# Patient Record
Sex: MALE | Race: WHITE | NOT HISPANIC OR LATINO | Employment: OTHER | URBAN - METROPOLITAN AREA
[De-identification: names, ages, dates, MRNs, and addresses within clinical notes are randomized per-mention and may not be internally consistent; named-entity substitution may affect disease eponyms.]

---

## 2017-08-23 ENCOUNTER — RECORDS - HEALTHEAST (OUTPATIENT)
Dept: LAB | Facility: CLINIC | Age: 77
End: 2017-08-23

## 2017-08-23 LAB
CHOLEST SERPL-MCNC: 135 MG/DL
FASTING STATUS PATIENT QL REPORTED: NORMAL
HDLC SERPL-MCNC: 41 MG/DL
LDLC SERPL CALC-MCNC: 81 MG/DL
TRIGL SERPL-MCNC: 64 MG/DL

## 2017-09-08 ENCOUNTER — AMBULATORY - HEALTHEAST (OUTPATIENT)
Dept: CARDIOLOGY | Facility: CLINIC | Age: 77
End: 2017-09-08

## 2017-09-08 ENCOUNTER — RECORDS - HEALTHEAST (OUTPATIENT)
Dept: ADMINISTRATIVE | Facility: OTHER | Age: 77
End: 2017-09-08

## 2017-09-13 ENCOUNTER — OFFICE VISIT - HEALTHEAST (OUTPATIENT)
Dept: CARDIOLOGY | Facility: CLINIC | Age: 77
End: 2017-09-13

## 2017-09-13 DIAGNOSIS — I49.1 PAC (PREMATURE ATRIAL CONTRACTION): ICD-10-CM

## 2017-09-13 DIAGNOSIS — I49.9 IRREGULAR HEART BEAT: ICD-10-CM

## 2017-09-13 DIAGNOSIS — I49.8 SINUS ARRHYTHMIA SEEN ON ELECTROCARDIOGRAM: ICD-10-CM

## 2017-09-13 DIAGNOSIS — I71.40 ABDOMINAL AORTIC ANEURYSM (AAA) WITHOUT RUPTURE (H): ICD-10-CM

## 2017-09-13 ASSESSMENT — MIFFLIN-ST. JEOR: SCORE: 1636.12

## 2017-09-14 ENCOUNTER — COMMUNICATION - HEALTHEAST (OUTPATIENT)
Dept: VASCULAR SURGERY | Facility: CLINIC | Age: 77
End: 2017-09-14

## 2017-12-08 ENCOUNTER — RECORDS - HEALTHEAST (OUTPATIENT)
Dept: ADMINISTRATIVE | Facility: OTHER | Age: 77
End: 2017-12-08

## 2017-12-12 ENCOUNTER — HOSPITAL ENCOUNTER (OUTPATIENT)
Dept: RESPIRATORY THERAPY | Facility: CLINIC | Age: 77
Discharge: HOME | End: 2017-12-12
Attending: SURGERY

## 2017-12-12 DIAGNOSIS — Z01.818 PRE-OP EXAM: ICD-10-CM

## 2017-12-15 ENCOUNTER — OFFICE VISIT - HEALTHEAST (OUTPATIENT)
Dept: PULMONOLOGY | Facility: OTHER | Age: 77
End: 2017-12-15

## 2017-12-15 DIAGNOSIS — J44.9 CHRONIC OBSTRUCTIVE PULMONARY DISEASE, UNSPECIFIED COPD TYPE (H): ICD-10-CM

## 2017-12-15 DIAGNOSIS — K21.9 GERD (GASTROESOPHAGEAL REFLUX DISEASE): ICD-10-CM

## 2017-12-15 DIAGNOSIS — J92.0 PLEURAL PLAQUE DUE TO ASBESTOS EXPOSURE: ICD-10-CM

## 2017-12-15 DIAGNOSIS — Z72.0 TOBACCO USER: ICD-10-CM

## 2017-12-15 DIAGNOSIS — Z01.811 ENCOUNTER FOR PREOPERATIVE PULMONARY EXAMINATION: ICD-10-CM

## 2017-12-15 ASSESSMENT — MIFFLIN-ST. JEOR: SCORE: 1640.43

## 2017-12-27 ENCOUNTER — AMBULATORY - HEALTHEAST (OUTPATIENT)
Dept: PULMONOLOGY | Facility: OTHER | Age: 77
End: 2017-12-27

## 2017-12-27 DIAGNOSIS — J44.9 COPD (CHRONIC OBSTRUCTIVE PULMONARY DISEASE) (H): ICD-10-CM

## 2017-12-28 ENCOUNTER — AMBULATORY - HEALTHEAST (OUTPATIENT)
Dept: PULMONOLOGY | Facility: OTHER | Age: 77
End: 2017-12-28

## 2017-12-28 DIAGNOSIS — J44.9 COPD (CHRONIC OBSTRUCTIVE PULMONARY DISEASE) (H): ICD-10-CM

## 2018-01-10 ENCOUNTER — RECORDS - HEALTHEAST (OUTPATIENT)
Dept: ADMINISTRATIVE | Facility: OTHER | Age: 78
End: 2018-01-10

## 2019-01-01 ENCOUNTER — HOME CARE/HOSPICE - HEALTHEAST (OUTPATIENT)
Dept: HOME HEALTH SERVICES | Facility: HOME HEALTH | Age: 79
End: 2019-01-01

## 2019-01-01 ENCOUNTER — COMMUNICATION - HEALTHEAST (OUTPATIENT)
Dept: PULMONOLOGY | Facility: OTHER | Age: 79
End: 2019-01-01

## 2019-01-01 ENCOUNTER — TRANSFERRED RECORDS (OUTPATIENT)
Dept: HEALTH INFORMATION MANAGEMENT | Facility: CLINIC | Age: 79
End: 2019-01-01

## 2019-01-01 ENCOUNTER — APPOINTMENT (OUTPATIENT)
Dept: GENERAL RADIOLOGY | Facility: CLINIC | Age: 79
DRG: 268 | End: 2019-01-01
Attending: CLINICAL NURSE SPECIALIST
Payer: MEDICARE

## 2019-01-01 ENCOUNTER — APPOINTMENT (OUTPATIENT)
Dept: PHYSICAL THERAPY | Facility: CLINIC | Age: 79
DRG: 268 | End: 2019-01-01
Attending: SURGERY
Payer: MEDICARE

## 2019-01-01 ENCOUNTER — APPOINTMENT (OUTPATIENT)
Dept: CT IMAGING | Facility: CLINIC | Age: 79
DRG: 268 | End: 2019-01-01
Payer: MEDICARE

## 2019-01-01 ENCOUNTER — ANESTHESIA EVENT (OUTPATIENT)
Dept: SURGERY | Facility: CLINIC | Age: 79
DRG: 268 | End: 2019-01-01
Payer: MEDICARE

## 2019-01-01 ENCOUNTER — RECORDS - HEALTHEAST (OUTPATIENT)
Dept: LAB | Facility: CLINIC | Age: 79
End: 2019-01-01

## 2019-01-01 ENCOUNTER — APPOINTMENT (OUTPATIENT)
Dept: PHYSICAL THERAPY | Facility: CLINIC | Age: 79
DRG: 268 | End: 2019-01-01
Payer: MEDICARE

## 2019-01-01 ENCOUNTER — APPOINTMENT (OUTPATIENT)
Dept: OCCUPATIONAL THERAPY | Facility: CLINIC | Age: 79
DRG: 268 | End: 2019-01-01
Payer: MEDICARE

## 2019-01-01 ENCOUNTER — APPOINTMENT (OUTPATIENT)
Dept: INTERVENTIONAL RADIOLOGY/VASCULAR | Facility: CLINIC | Age: 79
DRG: 268 | End: 2019-01-01
Attending: SURGERY
Payer: MEDICARE

## 2019-01-01 ENCOUNTER — APPOINTMENT (OUTPATIENT)
Dept: OCCUPATIONAL THERAPY | Facility: CLINIC | Age: 79
DRG: 268 | End: 2019-01-01
Attending: PHYSICIAN ASSISTANT
Payer: MEDICARE

## 2019-01-01 ENCOUNTER — ANESTHESIA (OUTPATIENT)
Dept: SURGERY | Facility: CLINIC | Age: 79
DRG: 268 | End: 2019-01-01
Payer: MEDICARE

## 2019-01-01 ENCOUNTER — APPOINTMENT (OUTPATIENT)
Dept: ULTRASOUND IMAGING | Facility: CLINIC | Age: 79
DRG: 268 | End: 2019-01-01
Attending: CLINICAL NURSE SPECIALIST
Payer: MEDICARE

## 2019-01-01 ENCOUNTER — HOSPITAL ENCOUNTER (INPATIENT)
Facility: CLINIC | Age: 79
LOS: 3 days | Discharge: HOME HEALTH | DRG: 268 | End: 2019-05-02
Attending: EMERGENCY MEDICINE | Admitting: INTERNAL MEDICINE
Payer: MEDICARE

## 2019-01-01 ENCOUNTER — PATIENT OUTREACH (OUTPATIENT)
Dept: CARE COORDINATION | Facility: CLINIC | Age: 79
End: 2019-01-01

## 2019-01-01 VITALS
HEIGHT: 70 IN | HEART RATE: 76 BPM | DIASTOLIC BLOOD PRESSURE: 66 MMHG | SYSTOLIC BLOOD PRESSURE: 130 MMHG | RESPIRATION RATE: 18 BRPM | BODY MASS INDEX: 28.03 KG/M2 | OXYGEN SATURATION: 97 % | TEMPERATURE: 95.6 F | WEIGHT: 195.77 LBS

## 2019-01-01 DIAGNOSIS — I71.30 RUPTURED ABDOMINAL AORTIC ANEURYSM (AAA) (H): ICD-10-CM

## 2019-01-01 DIAGNOSIS — D68.9 COAGULOPATHY (H): ICD-10-CM

## 2019-01-01 LAB
ABO + RH BLD: NORMAL
ABO + RH BLD: NORMAL
ALBUMIN SERPL-MCNC: 3.1 G/DL (ref 3.4–5)
ALBUMIN UR-MCNC: 10 MG/DL
ALP SERPL-CCNC: 96 U/L (ref 40–150)
ALT SERPL W P-5'-P-CCNC: 33 U/L (ref 0–70)
ALT SERPL-CCNC: 26 U/L (ref 0–45)
ANGLE RATE OF CLOT GROWTH: 29.4 DEG (ref 59–74)
ANGLE RATE OF CLOT GROWTH: 60.4 DEG (ref 59–74)
ANION GAP SERPL CALCULATED.3IONS-SCNC: 10 MMOL/L (ref 3–14)
ANION GAP SERPL CALCULATED.3IONS-SCNC: 3 MMOL/L (ref 3–14)
ANION GAP SERPL CALCULATED.3IONS-SCNC: 7 MMOL/L (ref 3–14)
ANION GAP SERPL CALCULATED.3IONS-SCNC: 8 MMOL/L (ref 3–14)
ANION GAP SERPL CALCULATED.3IONS-SCNC: 8 MMOL/L (ref 3–14)
ANION GAP SERPL CALCULATED.3IONS-SCNC: 8 MMOL/L (ref 5–18)
ANION GAP SERPL CALCULATED.3IONS-SCNC: 9 MMOL/L (ref 3–14)
ANION GAP SERPL CALCULATED.3IONS-SCNC: 9 MMOL/L (ref 3–14)
APPEARANCE UR: CLEAR
APTT PPP: 34 SEC (ref 22–37)
APTT PPP: 41 SEC (ref 22–37)
APTT PPP: 44 SEC (ref 22–37)
APTT PPP: 49 SEC (ref 22–37)
APTT PPP: 62 SEC (ref 22–37)
AST SERPL W P-5'-P-CCNC: 35 U/L (ref 0–45)
AST SERPL-CCNC: 35 U/L (ref 0–40)
BACTERIA SPEC CULT: NO GROWTH
BACTERIA SPEC CULT: NO GROWTH
BASE DEFICIT BLDA-SCNC: 1.2 MMOL/L
BASE EXCESS BLDA CALC-SCNC: 0.5 MMOL/L
BASOPHILS # BLD AUTO: 0 10E9/L (ref 0–0.2)
BASOPHILS NFR BLD AUTO: 0.4 %
BILIRUB DIRECT SERPL-MCNC: 0.1 MG/DL (ref 0–0.2)
BILIRUB SERPL-MCNC: 0.4 MG/DL (ref 0.2–1.3)
BILIRUB UR QL STRIP: NEGATIVE
BLD GP AB SCN SERPL QL: NORMAL
BLD PROD TYP BPU: NORMAL
BLD UNIT ID BPU: 0
BLOOD BANK CMNT PATIENT-IMP: NORMAL
BLOOD PRODUCT CODE: NORMAL
BPU ID: NORMAL
BUN SERPL-MCNC: 24 MG/DL (ref 8–28)
BUN SERPL-MCNC: 30 MG/DL (ref 7–30)
BUN SERPL-MCNC: 30 MG/DL (ref 7–30)
BUN SERPL-MCNC: 31 MG/DL (ref 7–30)
BUN SERPL-MCNC: 34 MG/DL (ref 7–30)
BUN SERPL-MCNC: 34 MG/DL (ref 7–30)
BUN SERPL-MCNC: 35 MG/DL (ref 7–30)
BUN SERPL-MCNC: 36 MG/DL (ref 7–30)
CA-I BLD-MCNC: 4.2 MG/DL (ref 4.4–5.2)
CA-I BLD-MCNC: 4.4 MG/DL (ref 4.4–5.2)
CA-I BLD-MCNC: 4.4 MG/DL (ref 4.4–5.2)
CA-I BLD-MCNC: 4.6 MG/DL (ref 4.4–5.2)
CA-I BLD-MCNC: 4.6 MG/DL (ref 4.4–5.2)
CA-I BLD-SCNC: 5 MG/DL (ref 4.4–5.2)
CALCIUM SERPL-MCNC: 7.8 MG/DL (ref 8.5–10.1)
CALCIUM SERPL-MCNC: 8 MG/DL (ref 8.5–10.1)
CALCIUM SERPL-MCNC: 8 MG/DL (ref 8.5–10.1)
CALCIUM SERPL-MCNC: 8.3 MG/DL (ref 8.5–10.1)
CALCIUM SERPL-MCNC: 8.4 MG/DL (ref 8.5–10.1)
CALCIUM SERPL-MCNC: 8.5 MG/DL (ref 8.5–10.1)
CALCIUM SERPL-MCNC: 8.6 MG/DL (ref 8.5–10.1)
CALCIUM SERPL-MCNC: 9.3 MG/DL (ref 8.5–10.5)
CHLORIDE BLD-SCNC: 103 MMOL/L (ref 98–107)
CHLORIDE SERPL-SCNC: 103 MMOL/L (ref 94–109)
CHLORIDE SERPL-SCNC: 104 MMOL/L (ref 94–109)
CHLORIDE SERPL-SCNC: 106 MMOL/L (ref 94–109)
CHOLEST SERPL-MCNC: 164 MG/DL
CI HYPOCOAGULATION INDEX: 0.2 RATIO (ref 0–3)
CI HYPOCOAGULATION INDEX: 10.2 RATIO (ref 0–3)
CLOT LYSIS 30M P MA LENFR BLD TEG: 0 % (ref 0–8)
CLOT LYSIS 30M P MA LENFR BLD TEG: 4 % (ref 0–8)
CLOT STRENGTH BLD TEG: 2 KD/SC (ref 5.3–13.2)
CLOT STRENGTH BLD TEG: 5.8 KD/SC (ref 5.3–13.2)
CO2 BLDCOV-SCNC: 24 MMOL/L (ref 21–28)
CO2 BLDCOV-SCNC: 24 MMOL/L (ref 21–28)
CO2 SERPL-SCNC: 23 MMOL/L (ref 20–32)
CO2 SERPL-SCNC: 24 MMOL/L (ref 20–32)
CO2 SERPL-SCNC: 25 MMOL/L (ref 20–32)
CO2 SERPL-SCNC: 25 MMOL/L (ref 20–32)
CO2 SERPL-SCNC: 26 MMOL/L (ref 20–32)
CO2 SERPL-SCNC: 26 MMOL/L (ref 20–32)
CO2 SERPL-SCNC: 26 MMOL/L (ref 22–31)
CO2 SERPL-SCNC: 27 MMOL/L (ref 20–32)
COLOR UR AUTO: ABNORMAL
CREAT SERPL-MCNC: 1.02 MG/DL (ref 0.66–1.25)
CREAT SERPL-MCNC: 1.04 MG/DL (ref 0.66–1.25)
CREAT SERPL-MCNC: 1.05 MG/DL (ref 0.66–1.25)
CREAT SERPL-MCNC: 1.05 MG/DL (ref 0.7–1.3)
CREAT SERPL-MCNC: 1.1 MG/DL (ref 0.66–1.25)
CREAT SERPL-MCNC: 1.11 MG/DL (ref 0.7–1.3)
CREAT SERPL-MCNC: 1.19 MG/DL (ref 0.66–1.25)
CREAT SERPL-MCNC: 1.43 MG/DL (ref 0.66–1.25)
CREAT SERPL-MCNC: 1.46 MG/DL (ref 0.66–1.25)
D DIMER PPP FEU-MCNC: >20 UG/ML FEU (ref 0–0.5)
DIFFERENTIAL METHOD BLD: NORMAL
EOSINOPHIL # BLD AUTO: 0.2 10E9/L (ref 0–0.7)
EOSINOPHIL NFR BLD AUTO: 2.7 %
ERYTHROCYTE [DISTWIDTH] IN BLOOD BY AUTOMATED COUNT: 15.6 % (ref 10–15)
ERYTHROCYTE [DISTWIDTH] IN BLOOD BY AUTOMATED COUNT: 15.8 % (ref 10–15)
ERYTHROCYTE [DISTWIDTH] IN BLOOD BY AUTOMATED COUNT: 15.8 % (ref 10–15)
ERYTHROCYTE [DISTWIDTH] IN BLOOD BY AUTOMATED COUNT: 16 % (ref 10–15)
ERYTHROCYTE [DISTWIDTH] IN BLOOD BY AUTOMATED COUNT: 16.4 % (ref 10–15)
ERYTHROCYTE [DISTWIDTH] IN BLOOD BY AUTOMATED COUNT: 17.1 % (ref 10–15)
ERYTHROCYTE [DISTWIDTH] IN BLOOD BY AUTOMATED COUNT: 17.3 % (ref 10–15)
ERYTHROCYTE [DISTWIDTH] IN BLOOD BY AUTOMATED COUNT: 17.5 % (ref 10–15)
ETHANOL SERPL-MCNC: <0.01 G/DL
FASTING STATUS PATIENT QL REPORTED: NO
FIBRINOGEN PPP-MCNC: 171 MG/DL (ref 200–420)
FIBRINOGEN PPP-MCNC: 194 MG/DL (ref 200–420)
FIBRINOGEN PPP-MCNC: 200 MG/DL (ref 200–420)
FIBRINOGEN PPP-MCNC: 235 MG/DL (ref 200–420)
FIBRINOGEN PPP-MCNC: <61 MG/DL (ref 200–420)
GFR SERPL CREATININE-BSD FRML MDRD: 45 ML/MIN/{1.73_M2}
GFR SERPL CREATININE-BSD FRML MDRD: 46 ML/MIN/{1.73_M2}
GFR SERPL CREATININE-BSD FRML MDRD: 57 ML/MIN/{1.73_M2}
GFR SERPL CREATININE-BSD FRML MDRD: 63 ML/MIN/{1.73_M2}
GFR SERPL CREATININE-BSD FRML MDRD: 66 ML/MIN/{1.73_M2}
GFR SERPL CREATININE-BSD FRML MDRD: 67 ML/MIN/{1.73_M2}
GFR SERPL CREATININE-BSD FRML MDRD: 69 ML/MIN/{1.73_M2}
GFR SERPL CREATININE-BSD FRML MDRD: >60 ML/MIN/1.73M2
GFR SERPL CREATININE-BSD FRML MDRD: >60 ML/MIN/1.73M2
GLUCOSE BLD-MCNC: 131 MG/DL (ref 70–99)
GLUCOSE BLD-MCNC: 143 MG/DL (ref 70–99)
GLUCOSE BLD-MCNC: 153 MG/DL (ref 70–99)
GLUCOSE BLD-MCNC: 95 MG/DL (ref 70–125)
GLUCOSE BLDC GLUCOMTR-MCNC: 148 MG/DL (ref 70–99)
GLUCOSE SERPL-MCNC: 101 MG/DL (ref 70–99)
GLUCOSE SERPL-MCNC: 107 MG/DL (ref 70–99)
GLUCOSE SERPL-MCNC: 108 MG/DL (ref 70–99)
GLUCOSE SERPL-MCNC: 122 MG/DL (ref 70–125)
GLUCOSE SERPL-MCNC: 131 MG/DL (ref 70–99)
GLUCOSE SERPL-MCNC: 144 MG/DL (ref 70–99)
GLUCOSE SERPL-MCNC: 96 MG/DL (ref 70–99)
GLUCOSE SERPL-MCNC: 97 MG/DL (ref 70–99)
GLUCOSE UR STRIP-MCNC: NEGATIVE MG/DL
HCO3 BLD-SCNC: 25 MMOL/L (ref 21–28)
HCO3 BLD-SCNC: 26 MMOL/L (ref 21–28)
HCT VFR BLD AUTO: 21.8 % (ref 40–53)
HCT VFR BLD AUTO: 22.8 % (ref 40–53)
HCT VFR BLD AUTO: 24 % (ref 40–53)
HCT VFR BLD AUTO: 24.7 % (ref 40–53)
HCT VFR BLD AUTO: 25.3 % (ref 40–53)
HCT VFR BLD AUTO: 26.4 % (ref 40–53)
HCT VFR BLD AUTO: 26.7 % (ref 40–53)
HCT VFR BLD AUTO: 31.1 % (ref 40–53)
HCT VFR BLD CALC: 31 %PCV (ref 40–53)
HDLC SERPL-MCNC: 45 MG/DL
HGB BLD CALC-MCNC: 10.5 G/DL (ref 13.3–17.7)
HGB BLD-MCNC: 10 G/DL (ref 13.3–17.7)
HGB BLD-MCNC: 7 G/DL (ref 13.3–17.7)
HGB BLD-MCNC: 7 G/DL (ref 13.3–17.7)
HGB BLD-MCNC: 7.5 G/DL (ref 13.3–17.7)
HGB BLD-MCNC: 7.6 G/DL (ref 13.3–17.7)
HGB BLD-MCNC: 7.7 G/DL (ref 13.3–17.7)
HGB BLD-MCNC: 7.8 G/DL (ref 13.3–17.7)
HGB BLD-MCNC: 7.9 G/DL (ref 13.3–17.7)
HGB BLD-MCNC: 8.4 G/DL (ref 13.3–17.7)
HGB BLD-MCNC: 8.5 G/DL (ref 13.3–17.7)
HGB BLD-MCNC: 8.6 G/DL (ref 13.3–17.7)
HGB UR QL STRIP: ABNORMAL
IMM GRANULOCYTES # BLD: 0.1 10E9/L (ref 0–0.4)
IMM GRANULOCYTES NFR BLD: 0.9 %
INR PPP: 1.31 (ref 0.86–1.14)
INR PPP: 1.33 (ref 0.86–1.14)
INR PPP: 1.33 (ref 0.86–1.14)
INR PPP: 1.35 (ref 0.86–1.14)
INR PPP: 1.44 (ref 0.86–1.14)
INR PPP: 1.48 (ref 0.86–1.14)
INR PPP: 1.96 (ref 0.86–1.14)
INR PPP: 2.06 (ref 0.9–1.1)
INR PPP: 3 (ref 0.86–1.14)
INTERPRETATION ECG - MUSE: NORMAL
K TIME TO SPEC CLOT STRENGTH: 2.4 MIN (ref 1–3)
K TIME TO SPEC CLOT STRENGTH: 9.7 MIN (ref 1–3)
KETONES UR STRIP-MCNC: NEGATIVE MG/DL
LACTATE BLD-SCNC: 0.6 MMOL/L (ref 0.7–2.1)
LACTATE BLD-SCNC: 0.7 MMOL/L (ref 0.7–2)
LACTATE BLD-SCNC: 0.8 MMOL/L (ref 0.7–2)
LACTATE BLD-SCNC: 0.9 MMOL/L (ref 0.7–2)
LACTATE BLD-SCNC: 1.4 MMOL/L (ref 0.7–2)
LDLC SERPL CALC-MCNC: 101 MG/DL
LEUKOCYTE ESTERASE UR QL STRIP: NEGATIVE
LIPASE SERPL-CCNC: 80 U/L (ref 73–393)
LY60 LYSIS AT 60 MINUTES: 0 % (ref 0–15)
LY60 LYSIS AT 60 MINUTES: 13 % (ref 0–15)
LYMPHOCYTES # BLD AUTO: 2.3 10E9/L (ref 0.8–5.3)
LYMPHOCYTES NFR BLD AUTO: 27 %
Lab: NORMAL
Lab: NORMAL
MA MAXIMUM CLOT STRENGTH: 29 MM (ref 55–74)
MA MAXIMUM CLOT STRENGTH: 53.5 MM (ref 55–74)
MAGNESIUM SERPL-MCNC: 2 MG/DL (ref 1.6–2.3)
MAGNESIUM SERPL-MCNC: 2.2 MG/DL (ref 1.6–2.3)
MAGNESIUM SERPL-MCNC: 2.4 MG/DL (ref 1.6–2.3)
MCH RBC QN AUTO: 29 PG (ref 26.5–33)
MCH RBC QN AUTO: 29.5 PG (ref 26.5–33)
MCH RBC QN AUTO: 29.5 PG (ref 26.5–33)
MCH RBC QN AUTO: 29.7 PG (ref 26.5–33)
MCH RBC QN AUTO: 30 PG (ref 26.5–33)
MCH RBC QN AUTO: 30.4 PG (ref 26.5–33)
MCHC RBC AUTO-ENTMCNC: 30.7 G/DL (ref 31.5–36.5)
MCHC RBC AUTO-ENTMCNC: 30.8 G/DL (ref 31.5–36.5)
MCHC RBC AUTO-ENTMCNC: 31.2 G/DL (ref 31.5–36.5)
MCHC RBC AUTO-ENTMCNC: 31.8 G/DL (ref 31.5–36.5)
MCHC RBC AUTO-ENTMCNC: 31.8 G/DL (ref 31.5–36.5)
MCHC RBC AUTO-ENTMCNC: 32.1 G/DL (ref 31.5–36.5)
MCHC RBC AUTO-ENTMCNC: 32.2 G/DL (ref 31.5–36.5)
MCHC RBC AUTO-ENTMCNC: 32.5 G/DL (ref 31.5–36.5)
MCV RBC AUTO: 92 FL (ref 78–100)
MCV RBC AUTO: 93 FL (ref 78–100)
MCV RBC AUTO: 94 FL (ref 78–100)
MCV RBC AUTO: 94 FL (ref 78–100)
MCV RBC AUTO: 97 FL (ref 78–100)
MONOCYTES # BLD AUTO: 0.7 10E9/L (ref 0–1.3)
MONOCYTES NFR BLD AUTO: 8.4 %
MRSA DNA SPEC QL NAA+PROBE: NEGATIVE
NEUTROPHILS # BLD AUTO: 5.1 10E9/L (ref 1.6–8.3)
NEUTROPHILS NFR BLD AUTO: 60.6 %
NITRATE UR QL: NEGATIVE
NRBC # BLD AUTO: 0 10*3/UL
NRBC BLD AUTO-RTO: 0 /100
NUM BPU REQUESTED: 2
NUM BPU REQUESTED: 20
NUM BPU REQUESTED: 5
NUM BPU REQUESTED: 5
O2/TOTAL GAS SETTING VFR VENT: 57 %
O2/TOTAL GAS SETTING VFR VENT: 82 %
PCO2 BLD: 45 MM HG (ref 35–45)
PCO2 BLD: 47 MM HG (ref 35–45)
PCO2 BLDV: 44 MM HG (ref 40–50)
PCO2 BLDV: 44 MM HG (ref 40–50)
PH BLD: 7.35 PH (ref 7.35–7.45)
PH BLD: 7.35 PH (ref 7.35–7.45)
PH BLDV: 7.35 PH (ref 7.32–7.43)
PH BLDV: 7.36 PH (ref 7.32–7.43)
PH UR STRIP: 6 PH (ref 5–7)
PHOSPHATE SERPL-MCNC: 5.2 MG/DL (ref 2.5–4.5)
PLATELET # BLD AUTO: 114 10E9/L (ref 150–450)
PLATELET # BLD AUTO: 154 10E9/L (ref 150–450)
PLATELET # BLD AUTO: 38 10E9/L (ref 150–450)
PLATELET # BLD AUTO: 66 10E9/L (ref 150–450)
PLATELET # BLD AUTO: 77 10E9/L (ref 150–450)
PLATELET # BLD AUTO: 79 10E9/L (ref 150–450)
PLATELET # BLD AUTO: 82 10E9/L (ref 150–450)
PLATELET # BLD AUTO: 85 10E9/L (ref 150–450)
PLATELET # BLD AUTO: 89 10E9/L (ref 150–450)
PO2 BLD: 166 MM HG (ref 80–105)
PO2 BLD: 197 MM HG (ref 80–105)
PO2 BLDV: 20 MM HG (ref 25–47)
PO2 BLDV: 22 MM HG (ref 25–47)
POTASSIUM BLD-SCNC: 4.6 MMOL/L (ref 3.5–5)
POTASSIUM BLD-SCNC: 4.8 MMOL/L (ref 3.4–5.3)
POTASSIUM BLD-SCNC: 4.9 MMOL/L (ref 3.4–5.3)
POTASSIUM BLD-SCNC: 4.9 MMOL/L (ref 3.4–5.3)
POTASSIUM SERPL-SCNC: 3.8 MMOL/L (ref 3.4–5.3)
POTASSIUM SERPL-SCNC: 3.9 MMOL/L (ref 3.4–5.3)
POTASSIUM SERPL-SCNC: 4 MMOL/L (ref 3.4–5.3)
POTASSIUM SERPL-SCNC: 4.1 MMOL/L (ref 3.4–5.3)
POTASSIUM SERPL-SCNC: 4.1 MMOL/L (ref 3.4–5.3)
POTASSIUM SERPL-SCNC: 4.2 MMOL/L (ref 3.4–5.3)
POTASSIUM SERPL-SCNC: 4.4 MMOL/L (ref 3.4–5.3)
POTASSIUM SERPL-SCNC: 4.7 MMOL/L (ref 3.4–5.3)
POTASSIUM SERPL-SCNC: 4.7 MMOL/L (ref 3.5–5)
PROT SERPL-MCNC: 7.3 G/DL (ref 6.8–8.8)
R TIME UNTIL CLOT FORMS: 4.1 MIN (ref 4–9)
R TIME UNTIL CLOT FORMS: 6.9 MIN (ref 4–9)
RADIOLOGIST FLAGS: ABNORMAL
RBC # BLD AUTO: 2.36 10E12/L (ref 4.4–5.9)
RBC # BLD AUTO: 2.37 10E12/L (ref 4.4–5.9)
RBC # BLD AUTO: 2.57 10E12/L (ref 4.4–5.9)
RBC # BLD AUTO: 2.62 10E12/L (ref 4.4–5.9)
RBC # BLD AUTO: 2.68 10E12/L (ref 4.4–5.9)
RBC # BLD AUTO: 2.83 10E12/L (ref 4.4–5.9)
RBC # BLD AUTO: 2.86 10E12/L (ref 4.4–5.9)
RBC # BLD AUTO: 3.33 10E12/L (ref 4.4–5.9)
RBC #/AREA URNS AUTO: 4 /HPF (ref 0–2)
SAO2 % BLDV FROM PO2: 30 %
SAO2 % BLDV FROM PO2: 34 %
SODIUM BLD-SCNC: 137 MMOL/L (ref 133–144)
SODIUM BLD-SCNC: 137 MMOL/L (ref 133–144)
SODIUM BLD-SCNC: 139 MMOL/L (ref 133–144)
SODIUM SERPL-SCNC: 134 MMOL/L (ref 133–144)
SODIUM SERPL-SCNC: 136 MMOL/L (ref 133–144)
SODIUM SERPL-SCNC: 137 MMOL/L (ref 136–145)
SODIUM SERPL-SCNC: 138 MMOL/L (ref 133–144)
SODIUM SERPL-SCNC: 138 MMOL/L (ref 133–144)
SODIUM SERPL-SCNC: 140 MMOL/L (ref 133–144)
SOURCE: ABNORMAL
SP GR UR STRIP: 1.01 (ref 1–1.03)
SPECIMEN EXP DATE BLD: NORMAL
SPECIMEN SOURCE: NORMAL
SQUAMOUS #/AREA URNS AUTO: 1 /HPF (ref 0–1)
TRANSFUSION STATUS PATIENT QL: NORMAL
TRIGL SERPL-MCNC: 89 MG/DL
TROPONIN I BLD-MCNC: 0 UG/L (ref 0–0.08)
TROPONIN I SERPL-MCNC: <0.015 UG/L (ref 0–0.04)
TSH SERPL-ACNC: 3.51 UIU/ML (ref 0.3–5)
UROBILINOGEN UR STRIP-MCNC: NORMAL MG/DL (ref 0–2)
WBC # BLD AUTO: 10.1 10E9/L (ref 4–11)
WBC # BLD AUTO: 5.5 10E9/L (ref 4–11)
WBC # BLD AUTO: 7.8 10E9/L (ref 4–11)
WBC # BLD AUTO: 8.1 10E9/L (ref 4–11)
WBC # BLD AUTO: 8.4 10E9/L (ref 4–11)
WBC # BLD AUTO: 8.9 10E9/L (ref 4–11)
WBC # BLD AUTO: 9.1 10E9/L (ref 4–11)
WBC # BLD AUTO: 9.6 10E9/L (ref 4–11)
WBC #/AREA URNS AUTO: 2 /HPF (ref 0–5)

## 2019-01-01 PROCEDURE — 85730 THROMBOPLASTIN TIME PARTIAL: CPT | Performed by: STUDENT IN AN ORGANIZED HEALTH CARE EDUCATION/TRAINING PROGRAM

## 2019-01-01 PROCEDURE — 36430 TRANSFUSION BLD/BLD COMPNT: CPT | Performed by: EMERGENCY MEDICINE

## 2019-01-01 PROCEDURE — 25000128 H RX IP 250 OP 636

## 2019-01-01 PROCEDURE — 85384 FIBRINOGEN ACTIVITY: CPT | Performed by: SURGERY

## 2019-01-01 PROCEDURE — 85384 FIBRINOGEN ACTIVITY: CPT

## 2019-01-01 PROCEDURE — A9270 NON-COVERED ITEM OR SERVICE: HCPCS | Performed by: SURGERY

## 2019-01-01 PROCEDURE — 84132 ASSAY OF SERUM POTASSIUM: CPT | Performed by: SURGERY

## 2019-01-01 PROCEDURE — 84484 ASSAY OF TROPONIN QUANT: CPT | Performed by: EMERGENCY MEDICINE

## 2019-01-01 PROCEDURE — 94640 AIRWAY INHALATION TREATMENT: CPT

## 2019-01-01 PROCEDURE — 40000003 ZZH STATISTIC IR STAFF TIME IN THE OR

## 2019-01-01 PROCEDURE — 82330 ASSAY OF CALCIUM: CPT | Performed by: SURGERY

## 2019-01-01 PROCEDURE — 85610 PROTHROMBIN TIME: CPT | Performed by: STUDENT IN AN ORGANIZED HEALTH CARE EDUCATION/TRAINING PROGRAM

## 2019-01-01 PROCEDURE — 85027 COMPLETE CBC AUTOMATED: CPT | Performed by: SURGERY

## 2019-01-01 PROCEDURE — 40000275 ZZH STATISTIC RCP TIME EA 10 MIN

## 2019-01-01 PROCEDURE — 94640 AIRWAY INHALATION TREATMENT: CPT | Mod: 76

## 2019-01-01 PROCEDURE — 83690 ASSAY OF LIPASE: CPT | Performed by: EMERGENCY MEDICINE

## 2019-01-01 PROCEDURE — 85610 PROTHROMBIN TIME: CPT | Performed by: SURGERY

## 2019-01-01 PROCEDURE — 99285 EMERGENCY DEPT VISIT HI MDM: CPT | Mod: 25 | Performed by: EMERGENCY MEDICINE

## 2019-01-01 PROCEDURE — 86850 RBC ANTIBODY SCREEN: CPT | Performed by: EMERGENCY MEDICINE

## 2019-01-01 PROCEDURE — 82330 ASSAY OF CALCIUM: CPT

## 2019-01-01 PROCEDURE — 86923 COMPATIBILITY TEST ELECTRIC: CPT | Performed by: EMERGENCY MEDICINE

## 2019-01-01 PROCEDURE — A9270 NON-COVERED ITEM OR SERVICE: HCPCS | Performed by: CLINICAL NURSE SPECIALIST

## 2019-01-01 PROCEDURE — 25000132 ZZH RX MED GY IP 250 OP 250 PS 637

## 2019-01-01 PROCEDURE — 37000009 ZZH ANESTHESIA TECHNICAL FEE, EACH ADDTL 15 MIN: Performed by: SURGERY

## 2019-01-01 PROCEDURE — 74174 CTA ABD&PLVS W/CONTRAST: CPT

## 2019-01-01 PROCEDURE — 84100 ASSAY OF PHOSPHORUS: CPT | Performed by: SURGERY

## 2019-01-01 PROCEDURE — 85396 CLOTTING ASSAY WHOLE BLOOD: CPT

## 2019-01-01 PROCEDURE — 97162 PT EVAL MOD COMPLEX 30 MIN: CPT | Mod: GP | Performed by: PHYSICAL THERAPIST

## 2019-01-01 PROCEDURE — 97165 OT EVAL LOW COMPLEX 30 MIN: CPT | Mod: GO | Performed by: OCCUPATIONAL THERAPIST

## 2019-01-01 PROCEDURE — 99221 1ST HOSP IP/OBS SF/LOW 40: CPT | Mod: GC | Performed by: INTERNAL MEDICINE

## 2019-01-01 PROCEDURE — A9270 NON-COVERED ITEM OR SERVICE: HCPCS | Performed by: STUDENT IN AN ORGANIZED HEALTH CARE EDUCATION/TRAINING PROGRAM

## 2019-01-01 PROCEDURE — 12000001 ZZH R&B MED SURG/OB UMMC

## 2019-01-01 PROCEDURE — 99233 SBSQ HOSP IP/OBS HIGH 50: CPT | Mod: GC | Performed by: SURGERY

## 2019-01-01 PROCEDURE — 25000132 ZZH RX MED GY IP 250 OP 250 PS 637: Performed by: SURGERY

## 2019-01-01 PROCEDURE — 20000004 ZZH R&B ICU UMMC

## 2019-01-01 PROCEDURE — C1887 CATHETER, GUIDING: HCPCS | Performed by: SURGERY

## 2019-01-01 PROCEDURE — 36415 COLL VENOUS BLD VENIPUNCTURE: CPT | Performed by: SURGERY

## 2019-01-01 PROCEDURE — P9012 CRYOPRECIPITATE EACH UNIT: HCPCS | Performed by: EMERGENCY MEDICINE

## 2019-01-01 PROCEDURE — P9016 RBC LEUKOCYTES REDUCED: HCPCS | Performed by: EMERGENCY MEDICINE

## 2019-01-01 PROCEDURE — P9037 PLATE PHERES LEUKOREDU IRRAD: HCPCS | Performed by: EMERGENCY MEDICINE

## 2019-01-01 PROCEDURE — P9041 ALBUMIN (HUMAN),5%, 50ML: HCPCS

## 2019-01-01 PROCEDURE — 82947 ASSAY GLUCOSE BLOOD QUANT: CPT | Performed by: STUDENT IN AN ORGANIZED HEALTH CARE EDUCATION/TRAINING PROGRAM

## 2019-01-01 PROCEDURE — 82803 BLOOD GASES ANY COMBINATION: CPT

## 2019-01-01 PROCEDURE — A9270 NON-COVERED ITEM OR SERVICE: HCPCS

## 2019-01-01 PROCEDURE — 87641 MR-STAPH DNA AMP PROBE: CPT | Performed by: STUDENT IN AN ORGANIZED HEALTH CARE EDUCATION/TRAINING PROGRAM

## 2019-01-01 PROCEDURE — 25000128 H RX IP 250 OP 636: Performed by: SURGERY

## 2019-01-01 PROCEDURE — 37000008 ZZH ANESTHESIA TECHNICAL FEE, 1ST 30 MIN: Performed by: SURGERY

## 2019-01-01 PROCEDURE — 96366 THER/PROPH/DIAG IV INF ADDON: CPT | Performed by: EMERGENCY MEDICINE

## 2019-01-01 PROCEDURE — 82330 ASSAY OF CALCIUM: CPT | Performed by: STUDENT IN AN ORGANIZED HEALTH CARE EDUCATION/TRAINING PROGRAM

## 2019-01-01 PROCEDURE — 83735 ASSAY OF MAGNESIUM: CPT | Performed by: SURGERY

## 2019-01-01 PROCEDURE — 40000556 ZZH STATISTIC PERIPHERAL IV START W US GUIDANCE

## 2019-01-01 PROCEDURE — 27210794 ZZH OR GENERAL SUPPLY STERILE: Performed by: SURGERY

## 2019-01-01 PROCEDURE — 94640 AIRWAY INHALATION TREATMENT: CPT | Performed by: EMERGENCY MEDICINE

## 2019-01-01 PROCEDURE — 97535 SELF CARE MNGMENT TRAINING: CPT | Mod: GO | Performed by: OCCUPATIONAL THERAPIST

## 2019-01-01 PROCEDURE — 40000497 ZZHCL STATISTIC SODIUM ED POCT

## 2019-01-01 PROCEDURE — 25000125 ZZHC RX 250: Performed by: STUDENT IN AN ORGANIZED HEALTH CARE EDUCATION/TRAINING PROGRAM

## 2019-01-01 PROCEDURE — 25800030 ZZH RX IP 258 OP 636: Performed by: EMERGENCY MEDICINE

## 2019-01-01 PROCEDURE — 80048 BASIC METABOLIC PNL TOTAL CA: CPT | Performed by: SURGERY

## 2019-01-01 PROCEDURE — 25000128 H RX IP 250 OP 636: Performed by: STUDENT IN AN ORGANIZED HEALTH CARE EDUCATION/TRAINING PROGRAM

## 2019-01-01 PROCEDURE — 82803 BLOOD GASES ANY COMBINATION: CPT | Performed by: STUDENT IN AN ORGANIZED HEALTH CARE EDUCATION/TRAINING PROGRAM

## 2019-01-01 PROCEDURE — 71045 X-RAY EXAM CHEST 1 VIEW: CPT

## 2019-01-01 PROCEDURE — 40000501 ZZHCL STATISTIC HEMATOCRIT ED POCT

## 2019-01-01 PROCEDURE — 99232 SBSQ HOSP IP/OBS MODERATE 35: CPT | Mod: GC | Performed by: SURGERY

## 2019-01-01 PROCEDURE — 85049 AUTOMATED PLATELET COUNT: CPT

## 2019-01-01 PROCEDURE — 83605 ASSAY OF LACTIC ACID: CPT

## 2019-01-01 PROCEDURE — 85027 COMPLETE CBC AUTOMATED: CPT | Performed by: EMERGENCY MEDICINE

## 2019-01-01 PROCEDURE — 85730 THROMBOPLASTIN TIME PARTIAL: CPT | Performed by: SURGERY

## 2019-01-01 PROCEDURE — 36000068 ZZH SURGERY LEVEL 5 1ST 30 MIN - UMMC: Performed by: SURGERY

## 2019-01-01 PROCEDURE — 25800030 ZZH RX IP 258 OP 636: Performed by: STUDENT IN AN ORGANIZED HEALTH CARE EDUCATION/TRAINING PROGRAM

## 2019-01-01 PROCEDURE — 83605 ASSAY OF LACTIC ACID: CPT | Performed by: STUDENT IN AN ORGANIZED HEALTH CARE EDUCATION/TRAINING PROGRAM

## 2019-01-01 PROCEDURE — 99291 CRITICAL CARE FIRST HOUR: CPT | Mod: 25 | Performed by: EMERGENCY MEDICINE

## 2019-01-01 PROCEDURE — 84295 ASSAY OF SERUM SODIUM: CPT

## 2019-01-01 PROCEDURE — 40000498 ZZHCL STATISTIC POTASSIUM ED POCT

## 2019-01-01 PROCEDURE — 80048 BASIC METABOLIC PNL TOTAL CA: CPT | Performed by: EMERGENCY MEDICINE

## 2019-01-01 PROCEDURE — 85610 PROTHROMBIN TIME: CPT | Performed by: EMERGENCY MEDICINE

## 2019-01-01 PROCEDURE — 80076 HEPATIC FUNCTION PANEL: CPT | Performed by: EMERGENCY MEDICINE

## 2019-01-01 PROCEDURE — 85610 PROTHROMBIN TIME: CPT

## 2019-01-01 PROCEDURE — 25000132 ZZH RX MED GY IP 250 OP 250 PS 637: Performed by: STUDENT IN AN ORGANIZED HEALTH CARE EDUCATION/TRAINING PROGRAM

## 2019-01-01 PROCEDURE — 80320 DRUG SCREEN QUANTALCOHOLS: CPT | Performed by: EMERGENCY MEDICINE

## 2019-01-01 PROCEDURE — 25000132 ZZH RX MED GY IP 250 OP 250 PS 637: Performed by: CLINICAL NURSE SPECIALIST

## 2019-01-01 PROCEDURE — 25000128 H RX IP 250 OP 636: Performed by: EMERGENCY MEDICINE

## 2019-01-01 PROCEDURE — 85730 THROMBOPLASTIN TIME PARTIAL: CPT

## 2019-01-01 PROCEDURE — 87040 BLOOD CULTURE FOR BACTERIA: CPT | Performed by: EMERGENCY MEDICINE

## 2019-01-01 PROCEDURE — 04V03E6: ICD-10-PCS | Performed by: SURGERY

## 2019-01-01 PROCEDURE — 25500064 ZZH RX 255 OP 636: Performed by: SURGERY

## 2019-01-01 PROCEDURE — 97110 THERAPEUTIC EXERCISES: CPT | Mod: GP

## 2019-01-01 PROCEDURE — 81003 URINALYSIS AUTO W/O SCOPE: CPT | Performed by: EMERGENCY MEDICINE

## 2019-01-01 PROCEDURE — 86900 BLOOD TYPING SEROLOGIC ABO: CPT | Performed by: EMERGENCY MEDICINE

## 2019-01-01 PROCEDURE — 40000502 ZZHCL STATISTIC GLUCOSE ED POCT

## 2019-01-01 PROCEDURE — 94640 AIRWAY INHALATION TREATMENT: CPT | Mod: 76 | Performed by: OPTOMETRIST

## 2019-01-01 PROCEDURE — 25000125 ZZHC RX 250: Performed by: EMERGENCY MEDICINE

## 2019-01-01 PROCEDURE — 40000344 ZZHCL STATISTIC THAWING COMPONENT: Performed by: EMERGENCY MEDICINE

## 2019-01-01 PROCEDURE — 96365 THER/PROPH/DIAG IV INF INIT: CPT | Performed by: EMERGENCY MEDICINE

## 2019-01-01 PROCEDURE — 85027 COMPLETE CBC AUTOMATED: CPT | Performed by: STUDENT IN AN ORGANIZED HEALTH CARE EDUCATION/TRAINING PROGRAM

## 2019-01-01 PROCEDURE — 85384 FIBRINOGEN ACTIVITY: CPT | Performed by: STUDENT IN AN ORGANIZED HEALTH CARE EDUCATION/TRAINING PROGRAM

## 2019-01-01 PROCEDURE — 00000146 ZZHCL STATISTIC GLUCOSE BY METER IP

## 2019-01-01 PROCEDURE — 93005 ELECTROCARDIOGRAM TRACING: CPT | Performed by: EMERGENCY MEDICINE

## 2019-01-01 PROCEDURE — P9059 PLASMA, FRZ BETWEEN 8-24HOUR: HCPCS | Performed by: EMERGENCY MEDICINE

## 2019-01-01 PROCEDURE — 36000070 ZZH SURGERY LEVEL 5 EA 15 ADDTL MIN - UMMC: Performed by: SURGERY

## 2019-01-01 PROCEDURE — C1760 CLOSURE DEV, VASC: HCPCS | Performed by: SURGERY

## 2019-01-01 PROCEDURE — 40000275 ZZH STATISTIC RCP TIME EA 10 MIN: Performed by: OPTOMETRIST

## 2019-01-01 PROCEDURE — C1768 GRAFT, VASCULAR: HCPCS | Performed by: SURGERY

## 2019-01-01 PROCEDURE — 25000125 ZZHC RX 250: Performed by: SURGERY

## 2019-01-01 PROCEDURE — 97116 GAIT TRAINING THERAPY: CPT | Mod: GP

## 2019-01-01 PROCEDURE — 85379 FIBRIN DEGRADATION QUANT: CPT | Performed by: EMERGENCY MEDICINE

## 2019-01-01 PROCEDURE — 93971 EXTREMITY STUDY: CPT | Mod: RT

## 2019-01-01 PROCEDURE — 84132 ASSAY OF SERUM POTASSIUM: CPT

## 2019-01-01 PROCEDURE — 27211020 ZZHC OR CELL SAVER OPNP: Performed by: SURGERY

## 2019-01-01 PROCEDURE — 97530 THERAPEUTIC ACTIVITIES: CPT | Mod: GP

## 2019-01-01 PROCEDURE — 25800030 ZZH RX IP 258 OP 636

## 2019-01-01 PROCEDURE — 87640 STAPH A DNA AMP PROBE: CPT | Performed by: STUDENT IN AN ORGANIZED HEALTH CARE EDUCATION/TRAINING PROGRAM

## 2019-01-01 PROCEDURE — 97530 THERAPEUTIC ACTIVITIES: CPT | Mod: GP | Performed by: PHYSICAL THERAPIST

## 2019-01-01 PROCEDURE — 84295 ASSAY OF SERUM SODIUM: CPT | Performed by: STUDENT IN AN ORGANIZED HEALTH CARE EDUCATION/TRAINING PROGRAM

## 2019-01-01 PROCEDURE — 25000565 ZZH ISOFLURANE, EA 15 MIN: Performed by: SURGERY

## 2019-01-01 PROCEDURE — 25000125 ZZHC RX 250: Performed by: NURSE ANESTHETIST, CERTIFIED REGISTERED

## 2019-01-01 PROCEDURE — 84132 ASSAY OF SERUM POTASSIUM: CPT | Performed by: STUDENT IN AN ORGANIZED HEALTH CARE EDUCATION/TRAINING PROGRAM

## 2019-01-01 PROCEDURE — 83605 ASSAY OF LACTIC ACID: CPT | Performed by: EMERGENCY MEDICINE

## 2019-01-01 PROCEDURE — 86901 BLOOD TYPING SEROLOGIC RH(D): CPT | Performed by: EMERGENCY MEDICINE

## 2019-01-01 PROCEDURE — C1725 CATH, TRANSLUMIN NON-LASER: HCPCS | Performed by: SURGERY

## 2019-01-01 PROCEDURE — 85730 THROMBOPLASTIN TIME PARTIAL: CPT | Performed by: EMERGENCY MEDICINE

## 2019-01-01 PROCEDURE — 85004 AUTOMATED DIFF WBC COUNT: CPT | Performed by: EMERGENCY MEDICINE

## 2019-01-01 PROCEDURE — 25000125 ZZHC RX 250

## 2019-01-01 PROCEDURE — 82947 ASSAY GLUCOSE BLOOD QUANT: CPT

## 2019-01-01 PROCEDURE — C1769 GUIDE WIRE: HCPCS | Performed by: SURGERY

## 2019-01-01 PROCEDURE — 93010 ELECTROCARDIOGRAM REPORT: CPT | Mod: Z6 | Performed by: EMERGENCY MEDICINE

## 2019-01-01 PROCEDURE — 85018 HEMOGLOBIN: CPT | Performed by: SURGERY

## 2019-01-01 PROCEDURE — 84484 ASSAY OF TROPONIN QUANT: CPT

## 2019-01-01 PROCEDURE — 25800030 ZZH RX IP 258 OP 636: Performed by: SURGERY

## 2019-01-01 PROCEDURE — C1894 INTRO/SHEATH, NON-LASER: HCPCS | Performed by: SURGERY

## 2019-01-01 DEVICE — IMPLANTABLE DEVICE: Type: IMPLANTABLE DEVICE | Site: AORTA | Status: FUNCTIONAL

## 2019-01-01 RX ORDER — POTASSIUM CL/LIDO/0.9 % NACL 10MEQ/0.1L
10 INTRAVENOUS SOLUTION, PIGGYBACK (ML) INTRAVENOUS
Status: DISCONTINUED | OUTPATIENT
Start: 2019-01-01 | End: 2019-01-01

## 2019-01-01 RX ORDER — METOCLOPRAMIDE HYDROCHLORIDE 5 MG/ML
5 INJECTION INTRAMUSCULAR; INTRAVENOUS EVERY 6 HOURS PRN
Status: DISCONTINUED | OUTPATIENT
Start: 2019-01-01 | End: 2019-01-01

## 2019-01-01 RX ORDER — SODIUM CHLORIDE, SODIUM LACTATE, POTASSIUM CHLORIDE, CALCIUM CHLORIDE 600; 310; 30; 20 MG/100ML; MG/100ML; MG/100ML; MG/100ML
INJECTION, SOLUTION INTRAVENOUS CONTINUOUS
Status: DISPENSED | OUTPATIENT
Start: 2019-01-01 | End: 2019-01-01

## 2019-01-01 RX ORDER — ESMOLOL HYDROCHLORIDE 20 MG/ML
50-300 INJECTION, SOLUTION INTRAVENOUS CONTINUOUS
Status: DISCONTINUED | OUTPATIENT
Start: 2019-01-01 | End: 2019-01-01

## 2019-01-01 RX ORDER — AMLODIPINE BESYLATE 2.5 MG/1
2.5 TABLET ORAL DAILY
Status: DISCONTINUED | OUTPATIENT
Start: 2019-01-01 | End: 2019-01-01

## 2019-01-01 RX ORDER — ATENOLOL 25 MG/1
25 TABLET ORAL AT BEDTIME
Status: ON HOLD | COMMUNITY
End: 2019-01-01

## 2019-01-01 RX ORDER — OXYCODONE HYDROCHLORIDE 5 MG/1
5-10 TABLET ORAL EVERY 4 HOURS PRN
Status: DISCONTINUED | OUTPATIENT
Start: 2019-01-01 | End: 2019-01-01 | Stop reason: HOSPADM

## 2019-01-01 RX ORDER — NALOXONE HYDROCHLORIDE 0.4 MG/ML
.1-.4 INJECTION, SOLUTION INTRAMUSCULAR; INTRAVENOUS; SUBCUTANEOUS
Status: DISCONTINUED | OUTPATIENT
Start: 2019-01-01 | End: 2019-01-01 | Stop reason: HOSPADM

## 2019-01-01 RX ORDER — ATENOLOL 25 MG/1
25 TABLET ORAL AT BEDTIME
Qty: 30 TABLET | Refills: 0 | COMMUNITY
Start: 2019-01-01

## 2019-01-01 RX ORDER — NALOXONE HYDROCHLORIDE 0.4 MG/ML
.1-.4 INJECTION, SOLUTION INTRAMUSCULAR; INTRAVENOUS; SUBCUTANEOUS
Status: DISCONTINUED | OUTPATIENT
Start: 2019-01-01 | End: 2019-01-01

## 2019-01-01 RX ORDER — ALBUTEROL SULFATE 90 UG/1
2 AEROSOL, METERED RESPIRATORY (INHALATION) EVERY 4 HOURS PRN
Status: DISCONTINUED | OUTPATIENT
Start: 2019-01-01 | End: 2019-01-01 | Stop reason: HOSPADM

## 2019-01-01 RX ORDER — ACETAMINOPHEN 325 MG/1
650 TABLET ORAL 4 TIMES DAILY
Status: ON HOLD | COMMUNITY
End: 2019-01-01

## 2019-01-01 RX ORDER — ACETAMINOPHEN 325 MG/1
650 TABLET ORAL EVERY 4 HOURS PRN
COMMUNITY

## 2019-01-01 RX ORDER — IPRATROPIUM BROMIDE AND ALBUTEROL SULFATE 2.5; .5 MG/3ML; MG/3ML
3 SOLUTION RESPIRATORY (INHALATION) ONCE
Status: COMPLETED | OUTPATIENT
Start: 2019-01-01 | End: 2019-01-01

## 2019-01-01 RX ORDER — ALBUMIN, HUMAN INJ 5% 5 %
SOLUTION INTRAVENOUS
Status: COMPLETED
Start: 2019-01-01 | End: 2019-01-01

## 2019-01-01 RX ORDER — IODIXANOL 320 MG/ML
INJECTION, SOLUTION INTRAVASCULAR PRN
Status: DISCONTINUED | OUTPATIENT
Start: 2019-01-01 | End: 2019-01-01 | Stop reason: HOSPADM

## 2019-01-01 RX ORDER — ETOMIDATE 2 MG/ML
INJECTION INTRAVENOUS PRN
Status: DISCONTINUED | OUTPATIENT
Start: 2019-01-01 | End: 2019-01-01 | Stop reason: CLARIF

## 2019-01-01 RX ORDER — HYDRALAZINE HYDROCHLORIDE 20 MG/ML
10-20 INJECTION INTRAMUSCULAR; INTRAVENOUS EVERY 6 HOURS PRN
Status: DISCONTINUED | OUTPATIENT
Start: 2019-01-01 | End: 2019-01-01 | Stop reason: HOSPADM

## 2019-01-01 RX ORDER — IOPAMIDOL 755 MG/ML
100 INJECTION, SOLUTION INTRAVASCULAR ONCE
Status: COMPLETED | OUTPATIENT
Start: 2019-01-01 | End: 2019-01-01

## 2019-01-01 RX ORDER — CEFAZOLIN SODIUM 2 G/100ML
2 INJECTION, SOLUTION INTRAVENOUS EVERY 8 HOURS
Status: COMPLETED | OUTPATIENT
Start: 2019-01-01 | End: 2019-01-01

## 2019-01-01 RX ORDER — HYDROXYZINE HYDROCHLORIDE 25 MG/1
25 TABLET, FILM COATED ORAL EVERY 6 HOURS PRN
Status: DISCONTINUED | OUTPATIENT
Start: 2019-01-01 | End: 2019-01-01 | Stop reason: HOSPADM

## 2019-01-01 RX ORDER — IPRATROPIUM BROMIDE AND ALBUTEROL SULFATE 2.5; .5 MG/3ML; MG/3ML
3 SOLUTION RESPIRATORY (INHALATION)
Status: DISCONTINUED | OUTPATIENT
Start: 2019-01-01 | End: 2019-01-01 | Stop reason: HOSPADM

## 2019-01-01 RX ORDER — MAGNESIUM SULFATE HEPTAHYDRATE 40 MG/ML
4 INJECTION, SOLUTION INTRAVENOUS EVERY 4 HOURS PRN
Status: DISCONTINUED | OUTPATIENT
Start: 2019-01-01 | End: 2019-01-01 | Stop reason: HOSPADM

## 2019-01-01 RX ORDER — ASPIRIN 81 MG/1
81 TABLET, CHEWABLE ORAL DAILY
Qty: 100 TABLET | Refills: 0 | COMMUNITY
Start: 2019-01-01

## 2019-01-01 RX ORDER — BUPIVACAINE HYDROCHLORIDE 2.5 MG/ML
INJECTION, SOLUTION INFILTRATION; PERINEURAL PRN
Status: DISCONTINUED | OUTPATIENT
Start: 2019-01-01 | End: 2019-01-01 | Stop reason: HOSPADM

## 2019-01-01 RX ORDER — FENTANYL CITRATE 50 UG/ML
INJECTION, SOLUTION INTRAMUSCULAR; INTRAVENOUS PRN
Status: DISCONTINUED | OUTPATIENT
Start: 2019-01-01 | End: 2019-01-01 | Stop reason: CLARIF

## 2019-01-01 RX ORDER — POTASSIUM CHLORIDE 29.8 MG/ML
20 INJECTION INTRAVENOUS
Status: DISCONTINUED | OUTPATIENT
Start: 2019-01-01 | End: 2019-01-01

## 2019-01-01 RX ORDER — HYDROXYZINE HYDROCHLORIDE 25 MG/1
50 TABLET, FILM COATED ORAL EVERY 6 HOURS PRN
Status: DISCONTINUED | OUTPATIENT
Start: 2019-01-01 | End: 2019-01-01 | Stop reason: HOSPADM

## 2019-01-01 RX ORDER — LABETALOL 20 MG/4 ML (5 MG/ML) INTRAVENOUS SYRINGE
10 EVERY 4 HOURS PRN
Status: DISCONTINUED | OUTPATIENT
Start: 2019-01-01 | End: 2019-01-01

## 2019-01-01 RX ORDER — SODIUM CHLORIDE, SODIUM GLUCONATE, SODIUM ACETATE, POTASSIUM CHLORIDE AND MAGNESIUM CHLORIDE 526; 502; 368; 37; 30 MG/100ML; MG/100ML; MG/100ML; MG/100ML; MG/100ML
INJECTION, SOLUTION INTRAVENOUS CONTINUOUS PRN
Status: DISCONTINUED | OUTPATIENT
Start: 2019-01-01 | End: 2019-01-01 | Stop reason: CLARIF

## 2019-01-01 RX ORDER — POTASSIUM CHLORIDE 750 MG/1
20-40 TABLET, EXTENDED RELEASE ORAL
Status: DISCONTINUED | OUTPATIENT
Start: 2019-01-01 | End: 2019-01-01

## 2019-01-01 RX ORDER — PROCHLORPERAZINE MALEATE 5 MG
5 TABLET ORAL EVERY 6 HOURS PRN
Status: DISCONTINUED | OUTPATIENT
Start: 2019-01-01 | End: 2019-01-01

## 2019-01-01 RX ORDER — ALBUTEROL SULFATE 90 UG/1
2 AEROSOL, METERED RESPIRATORY (INHALATION) EVERY 4 HOURS PRN
COMMUNITY

## 2019-01-01 RX ORDER — ESMOLOL HYDROCHLORIDE 10 MG/ML
INJECTION INTRAVENOUS PRN
Status: DISCONTINUED | OUTPATIENT
Start: 2019-01-01 | End: 2019-01-01 | Stop reason: CLARIF

## 2019-01-01 RX ORDER — POTASSIUM CHLORIDE 1.5 G/1.58G
20-40 POWDER, FOR SOLUTION ORAL
Status: DISCONTINUED | OUTPATIENT
Start: 2019-01-01 | End: 2019-01-01

## 2019-01-01 RX ORDER — METOCLOPRAMIDE 5 MG/1
5 TABLET ORAL EVERY 6 HOURS PRN
Status: DISCONTINUED | OUTPATIENT
Start: 2019-01-01 | End: 2019-01-01

## 2019-01-01 RX ORDER — ALBUMIN, HUMAN INJ 5% 5 %
12.5 SOLUTION INTRAVENOUS ONCE
Status: COMPLETED | OUTPATIENT
Start: 2019-01-01 | End: 2019-01-01

## 2019-01-01 RX ORDER — POTASSIUM CHLORIDE 7.45 MG/ML
10 INJECTION INTRAVENOUS
Status: DISCONTINUED | OUTPATIENT
Start: 2019-01-01 | End: 2019-01-01

## 2019-01-01 RX ORDER — ESMOLOL HYDROCHLORIDE 20 MG/ML
INJECTION, SOLUTION INTRAVENOUS CONTINUOUS PRN
Status: DISCONTINUED | OUTPATIENT
Start: 2019-01-01 | End: 2019-01-01 | Stop reason: CLARIF

## 2019-01-01 RX ORDER — SIMVASTATIN 10 MG
10 TABLET ORAL AT BEDTIME
Status: DISCONTINUED | OUTPATIENT
Start: 2019-01-01 | End: 2019-01-01 | Stop reason: HOSPADM

## 2019-01-01 RX ORDER — SODIUM CHLORIDE 9 MG/ML
INJECTION, SOLUTION INTRAVENOUS
Status: COMPLETED
Start: 2019-01-01 | End: 2019-01-01

## 2019-01-01 RX ORDER — CEFAZOLIN SODIUM 2 G/100ML
INJECTION, SOLUTION INTRAVENOUS PRN
Status: DISCONTINUED | OUTPATIENT
Start: 2019-01-01 | End: 2019-01-01 | Stop reason: CLARIF

## 2019-01-01 RX ORDER — ONDANSETRON 2 MG/ML
4 INJECTION INTRAMUSCULAR; INTRAVENOUS EVERY 6 HOURS PRN
Status: DISCONTINUED | OUTPATIENT
Start: 2019-01-01 | End: 2019-01-01 | Stop reason: HOSPADM

## 2019-01-01 RX ORDER — ASPIRIN 81 MG/1
81 TABLET, CHEWABLE ORAL DAILY
Status: DISCONTINUED | OUTPATIENT
Start: 2019-01-01 | End: 2019-01-01 | Stop reason: HOSPADM

## 2019-01-01 RX ORDER — SODIUM CHLORIDE 9 MG/ML
INJECTION, SOLUTION INTRAVENOUS CONTINUOUS
Status: DISCONTINUED | OUTPATIENT
Start: 2019-01-01 | End: 2019-01-01

## 2019-01-01 RX ORDER — PROPOFOL 10 MG/ML
INJECTION, EMULSION INTRAVENOUS PRN
Status: DISCONTINUED | OUTPATIENT
Start: 2019-01-01 | End: 2019-01-01 | Stop reason: CLARIF

## 2019-01-01 RX ORDER — NITROGLYCERIN 10 MG/100ML
INJECTION INTRAVENOUS PRN
Status: DISCONTINUED | OUTPATIENT
Start: 2019-01-01 | End: 2019-01-01 | Stop reason: CLARIF

## 2019-01-01 RX ORDER — HYDRALAZINE HYDROCHLORIDE 20 MG/ML
10-20 INJECTION INTRAMUSCULAR; INTRAVENOUS EVERY 6 HOURS PRN
Status: DISCONTINUED | OUTPATIENT
Start: 2019-01-01 | End: 2019-01-01

## 2019-01-01 RX ORDER — ATENOLOL 25 MG/1
25 TABLET ORAL AT BEDTIME
Status: DISCONTINUED | OUTPATIENT
Start: 2019-01-01 | End: 2019-01-01 | Stop reason: HOSPADM

## 2019-01-01 RX ORDER — NOREPINEPHRINE BITARTRATE/D5W 16MG/250ML
.03-.4 PLASTIC BAG, INJECTION (ML) INTRAVENOUS CONTINUOUS
Status: DISCONTINUED | OUTPATIENT
Start: 2019-01-01 | End: 2019-01-01

## 2019-01-01 RX ORDER — LIDOCAINE HYDROCHLORIDE 20 MG/ML
INJECTION, SOLUTION INFILTRATION; PERINEURAL PRN
Status: DISCONTINUED | OUTPATIENT
Start: 2019-01-01 | End: 2019-01-01 | Stop reason: CLARIF

## 2019-01-01 RX ORDER — ONDANSETRON 4 MG/1
4 TABLET, ORALLY DISINTEGRATING ORAL EVERY 6 HOURS PRN
Status: DISCONTINUED | OUTPATIENT
Start: 2019-01-01 | End: 2019-01-01 | Stop reason: HOSPADM

## 2019-01-01 RX ORDER — ACETAMINOPHEN 325 MG/1
650 TABLET ORAL EVERY 4 HOURS PRN
Status: DISPENSED | OUTPATIENT
Start: 2019-01-01 | End: 2019-01-01

## 2019-01-01 RX ORDER — SIMVASTATIN 10 MG
10 TABLET ORAL AT BEDTIME
COMMUNITY

## 2019-01-01 RX ORDER — LABETALOL 20 MG/4 ML (5 MG/ML) INTRAVENOUS SYRINGE
10 EVERY 6 HOURS PRN
Status: DISCONTINUED | OUTPATIENT
Start: 2019-01-01 | End: 2019-01-01

## 2019-01-01 RX ORDER — ATENOLOL 25 MG/1
25 TABLET ORAL AT BEDTIME
Status: DISCONTINUED | OUTPATIENT
Start: 2019-01-01 | End: 2019-01-01

## 2019-01-01 RX ORDER — LABETALOL 20 MG/4 ML (5 MG/ML) INTRAVENOUS SYRINGE
20 EVERY 4 HOURS PRN
Status: DISCONTINUED | OUTPATIENT
Start: 2019-01-01 | End: 2019-01-01

## 2019-01-01 RX ORDER — LIDOCAINE 40 MG/G
CREAM TOPICAL
Status: DISCONTINUED | OUTPATIENT
Start: 2019-01-01 | End: 2019-01-01 | Stop reason: HOSPADM

## 2019-01-01 RX ORDER — LABETALOL 20 MG/4 ML (5 MG/ML) INTRAVENOUS SYRINGE
20 EVERY 4 HOURS PRN
Status: DISCONTINUED | OUTPATIENT
Start: 2019-01-01 | End: 2019-01-01 | Stop reason: HOSPADM

## 2019-01-01 RX ADMIN — ACETAMINOPHEN 650 MG: 325 TABLET, FILM COATED ORAL at 03:43

## 2019-01-01 RX ADMIN — ACETAMINOPHEN 650 MG: 325 TABLET, FILM COATED ORAL at 01:27

## 2019-01-01 RX ADMIN — UMECLIDINIUM BROMIDE AND VILANTEROL TRIFENATATE 1 PUFF: 62.5; 25 POWDER RESPIRATORY (INHALATION) at 16:37

## 2019-01-01 RX ADMIN — ACETAMINOPHEN 650 MG: 325 TABLET, FILM COATED ORAL at 20:46

## 2019-01-01 RX ADMIN — FENTANYL CITRATE 50 MCG: 50 INJECTION, SOLUTION INTRAMUSCULAR; INTRAVENOUS at 03:45

## 2019-01-01 RX ADMIN — IPRATROPIUM BROMIDE AND ALBUTEROL SULFATE 3 ML: .5; 3 SOLUTION RESPIRATORY (INHALATION) at 02:17

## 2019-01-01 RX ADMIN — LIDOCAINE HYDROCHLORIDE 40 MG: 20 INJECTION, SOLUTION INFILTRATION; PERINEURAL at 02:50

## 2019-01-01 RX ADMIN — CEFAZOLIN SODIUM 2 G: 2 INJECTION, SOLUTION INTRAVENOUS at 03:40

## 2019-01-01 RX ADMIN — ESMOLOL HYDROCHLORIDE 20 MG: 10 INJECTION, SOLUTION INTRAVENOUS at 02:47

## 2019-01-01 RX ADMIN — ESMOLOL HYDROCHLORIDE IN SODIUM CHLORIDE 100 MCG/KG/MIN: 20 INJECTION INTRAVENOUS at 17:02

## 2019-01-01 RX ADMIN — ACETAMINOPHEN 650 MG: 325 TABLET, FILM COATED ORAL at 18:17

## 2019-01-01 RX ADMIN — ONDANSETRON 4 MG: 2 INJECTION INTRAMUSCULAR; INTRAVENOUS at 16:31

## 2019-01-01 RX ADMIN — ATENOLOL 25 MG: 25 TABLET ORAL at 23:27

## 2019-01-01 RX ADMIN — LABETALOL 20 MG/4 ML (5 MG/ML) INTRAVENOUS SYRINGE 20 MG: at 07:55

## 2019-01-01 RX ADMIN — IPRATROPIUM BROMIDE AND ALBUTEROL SULFATE 3 ML: .5; 3 SOLUTION RESPIRATORY (INHALATION) at 08:19

## 2019-01-01 RX ADMIN — IPRATROPIUM BROMIDE AND ALBUTEROL SULFATE 3 ML: .5; 3 SOLUTION RESPIRATORY (INHALATION) at 07:49

## 2019-01-01 RX ADMIN — RANITIDINE 300 MG: 150 TABLET ORAL at 20:23

## 2019-01-01 RX ADMIN — ALBUMIN HUMAN 12.5 G: 50 SOLUTION INTRAVENOUS at 04:19

## 2019-01-01 RX ADMIN — NOREPINEPHRINE BITARTRATE 6.4 MCG: 1 INJECTION INTRAVENOUS at 03:19

## 2019-01-01 RX ADMIN — ESMOLOL HYDROCHLORIDE 20 MG: 10 INJECTION, SOLUTION INTRAVENOUS at 02:54

## 2019-01-01 RX ADMIN — ESMOLOL HYDROCHLORIDE IN SODIUM CHLORIDE 50 MCG/KG/MIN: 20 INJECTION INTRAVENOUS at 23:49

## 2019-01-01 RX ADMIN — SODIUM CHLORIDE, POTASSIUM CHLORIDE, SODIUM LACTATE AND CALCIUM CHLORIDE: 600; 310; 30; 20 INJECTION, SOLUTION INTRAVENOUS at 06:13

## 2019-01-01 RX ADMIN — SIMVASTATIN 10 MG: 10 TABLET, FILM COATED ORAL at 20:56

## 2019-01-01 RX ADMIN — HYDRALAZINE HYDROCHLORIDE 10 MG: 20 INJECTION, SOLUTION INTRAMUSCULAR; INTRAVENOUS at 16:00

## 2019-01-01 RX ADMIN — ATENOLOL 25 MG: 25 TABLET ORAL at 21:08

## 2019-01-01 RX ADMIN — LABETALOL 20 MG/4 ML (5 MG/ML) INTRAVENOUS SYRINGE 10 MG: at 09:59

## 2019-01-01 RX ADMIN — ACETAMINOPHEN 650 MG: 325 TABLET, FILM COATED ORAL at 23:40

## 2019-01-01 RX ADMIN — PROPOFOL 50 MG: 10 INJECTION, EMULSION INTRAVENOUS at 02:50

## 2019-01-01 RX ADMIN — SIMVASTATIN 10 MG: 10 TABLET, FILM COATED ORAL at 22:22

## 2019-01-01 RX ADMIN — FENTANYL CITRATE 50 MCG: 50 INJECTION, SOLUTION INTRAMUSCULAR; INTRAVENOUS at 02:33

## 2019-01-01 RX ADMIN — LABETALOL 20 MG/4 ML (5 MG/ML) INTRAVENOUS SYRINGE 20 MG: at 18:17

## 2019-01-01 RX ADMIN — TRANEXAMIC ACID 1 G: 100 INJECTION, SOLUTION INTRAVENOUS at 03:30

## 2019-01-01 RX ADMIN — ESMOLOL HYDROCHLORIDE IN SODIUM CHLORIDE 150 MCG/KG/MIN: 20 INJECTION INTRAVENOUS at 06:21

## 2019-01-01 RX ADMIN — UMECLIDINIUM BROMIDE AND VILANTEROL TRIFENATATE 1 PUFF: 62.5; 25 POWDER RESPIRATORY (INHALATION) at 07:52

## 2019-01-01 RX ADMIN — IOPAMIDOL 100 ML: 755 INJECTION, SOLUTION INTRAVENOUS at 00:59

## 2019-01-01 RX ADMIN — HYDRALAZINE HYDROCHLORIDE 20 MG: 20 INJECTION INTRAMUSCULAR; INTRAVENOUS at 01:05

## 2019-01-01 RX ADMIN — ATENOLOL 25 MG: 25 TABLET ORAL at 20:56

## 2019-01-01 RX ADMIN — NITROGLYCERIN 50 MCG: 10 INJECTION INTRAVENOUS at 03:14

## 2019-01-01 RX ADMIN — OXYCODONE HYDROCHLORIDE 5 MG: 5 TABLET ORAL at 08:44

## 2019-01-01 RX ADMIN — IPRATROPIUM BROMIDE AND ALBUTEROL SULFATE 3 ML: .5; 3 SOLUTION RESPIRATORY (INHALATION) at 13:51

## 2019-01-01 RX ADMIN — SODIUM CHLORIDE, PRESERVATIVE FREE 100 ML: 5 INJECTION INTRAVENOUS at 01:03

## 2019-01-01 RX ADMIN — IPRATROPIUM BROMIDE AND ALBUTEROL SULFATE 3 ML: .5; 3 SOLUTION RESPIRATORY (INHALATION) at 11:17

## 2019-01-01 RX ADMIN — FENTANYL CITRATE 50 MCG: 50 INJECTION, SOLUTION INTRAMUSCULAR; INTRAVENOUS at 02:45

## 2019-01-01 RX ADMIN — FLUTICASONE FUROATE AND VILANTEROL TRIFENATATE 1 PUFF: 200; 25 POWDER RESPIRATORY (INHALATION) at 07:45

## 2019-01-01 RX ADMIN — FLUTICASONE FUROATE AND VILANTEROL TRIFENATATE 1 PUFF: 200; 25 POWDER RESPIRATORY (INHALATION) at 09:50

## 2019-01-01 RX ADMIN — Medication 80 MG: at 02:50

## 2019-01-01 RX ADMIN — CEFAZOLIN SODIUM 2 G: 2 INJECTION, SOLUTION INTRAVENOUS at 12:00

## 2019-01-01 RX ADMIN — SIMVASTATIN 10 MG: 10 TABLET, FILM COATED ORAL at 21:08

## 2019-01-01 RX ADMIN — ROCURONIUM BROMIDE 40 MG: 10 INJECTION INTRAVENOUS at 02:54

## 2019-01-01 RX ADMIN — IPRATROPIUM BROMIDE AND ALBUTEROL SULFATE 3 ML: .5; 3 SOLUTION RESPIRATORY (INHALATION) at 12:07

## 2019-01-01 RX ADMIN — FENTANYL CITRATE 50 MCG: 50 INJECTION, SOLUTION INTRAMUSCULAR; INTRAVENOUS at 04:47

## 2019-01-01 RX ADMIN — PROCHLORPERAZINE EDISYLATE 5 MG: 5 INJECTION INTRAMUSCULAR; INTRAVENOUS at 17:14

## 2019-01-01 RX ADMIN — LABETALOL 20 MG/4 ML (5 MG/ML) INTRAVENOUS SYRINGE 20 MG: at 20:46

## 2019-01-01 RX ADMIN — FLUTICASONE FUROATE AND VILANTEROL TRIFENATATE 1 PUFF: 200; 25 POWDER RESPIRATORY (INHALATION) at 13:29

## 2019-01-01 RX ADMIN — SODIUM CHLORIDE, SODIUM GLUCONATE, SODIUM ACETATE, POTASSIUM CHLORIDE AND MAGNESIUM CHLORIDE: 526; 502; 368; 37; 30 INJECTION, SOLUTION INTRAVENOUS at 02:30

## 2019-01-01 RX ADMIN — ACETAMINOPHEN 650 MG: 325 TABLET, FILM COATED ORAL at 08:52

## 2019-01-01 RX ADMIN — ETOMIDATE 10 MG: 2 INJECTION INTRAVENOUS at 02:54

## 2019-01-01 RX ADMIN — SUGAMMADEX 200 MG: 100 INJECTION, SOLUTION INTRAVENOUS at 04:53

## 2019-01-01 RX ADMIN — UMECLIDINIUM BROMIDE AND VILANTEROL TRIFENATATE 1 PUFF: 62.5; 25 POWDER RESPIRATORY (INHALATION) at 07:38

## 2019-01-01 RX ADMIN — FLUTICASONE FUROATE AND VILANTEROL TRIFENATATE 1 PUFF: 200; 25 POWDER RESPIRATORY (INHALATION) at 07:38

## 2019-01-01 RX ADMIN — PROPOFOL 30 MG: 10 INJECTION, EMULSION INTRAVENOUS at 04:45

## 2019-01-01 RX ADMIN — CEFAZOLIN SODIUM 2 G: 2 INJECTION, SOLUTION INTRAVENOUS at 20:17

## 2019-01-01 RX ADMIN — ASPIRIN 81 MG CHEWABLE TABLET 81 MG: 81 TABLET CHEWABLE at 12:25

## 2019-01-01 RX ADMIN — IPRATROPIUM BROMIDE AND ALBUTEROL SULFATE 3 ML: .5; 3 SOLUTION RESPIRATORY (INHALATION) at 19:55

## 2019-01-01 RX ADMIN — HYDRALAZINE HYDROCHLORIDE 20 MG: 20 INJECTION INTRAMUSCULAR; INTRAVENOUS at 07:45

## 2019-01-01 RX ADMIN — HYDROXYZINE HYDROCHLORIDE 50 MG: 25 TABLET ORAL at 02:20

## 2019-01-01 RX ADMIN — IPRATROPIUM BROMIDE AND ALBUTEROL SULFATE 3 ML: .5; 3 SOLUTION RESPIRATORY (INHALATION) at 15:45

## 2019-01-01 RX ADMIN — ALBUMIN HUMAN 12.5 G: 0.05 INJECTION, SOLUTION INTRAVENOUS at 04:19

## 2019-01-01 RX ADMIN — ESMOLOL HYDROCHLORIDE 220 MCG/KG/MIN: 20 INJECTION INTRAVENOUS at 02:30

## 2019-01-01 RX ADMIN — FENTANYL CITRATE 50 MCG: 50 INJECTION, SOLUTION INTRAMUSCULAR; INTRAVENOUS at 03:14

## 2019-01-01 RX ADMIN — RANITIDINE 300 MG: 150 TABLET ORAL at 20:55

## 2019-01-01 RX ADMIN — IPRATROPIUM BROMIDE AND ALBUTEROL SULFATE 3 ML: .5; 3 SOLUTION RESPIRATORY (INHALATION) at 05:46

## 2019-01-01 RX ADMIN — IPRATROPIUM BROMIDE AND ALBUTEROL SULFATE 3 ML: .5; 3 SOLUTION RESPIRATORY (INHALATION) at 12:04

## 2019-01-01 RX ADMIN — SODIUM CHLORIDE: 9 INJECTION, SOLUTION INTRAVENOUS at 06:55

## 2019-01-01 RX ADMIN — OXYCODONE HYDROCHLORIDE 5 MG: 5 TABLET ORAL at 03:34

## 2019-01-01 RX ADMIN — ESMOLOL HYDROCHLORIDE 40 MG: 10 INJECTION, SOLUTION INTRAVENOUS at 04:52

## 2019-01-01 RX ADMIN — LABETALOL 20 MG/4 ML (5 MG/ML) INTRAVENOUS SYRINGE 20 MG: at 13:32

## 2019-01-01 RX ADMIN — ESMOLOL HYDROCHLORIDE 40 MG: 10 INJECTION, SOLUTION INTRAVENOUS at 04:45

## 2019-01-01 RX ADMIN — RANITIDINE 150 MG: 150 TABLET ORAL at 20:07

## 2019-01-01 RX ADMIN — ROCURONIUM BROMIDE 30 MG: 10 INJECTION INTRAVENOUS at 03:56

## 2019-01-01 RX ADMIN — ONDANSETRON 4 MG: 2 INJECTION INTRAMUSCULAR; INTRAVENOUS at 05:37

## 2019-01-01 RX ADMIN — ACETAMINOPHEN 650 MG: 325 TABLET, FILM COATED ORAL at 13:09

## 2019-01-01 RX ADMIN — UMECLIDINIUM BROMIDE AND VILANTEROL TRIFENATATE 1 PUFF: 62.5; 25 POWDER RESPIRATORY (INHALATION) at 09:50

## 2019-01-01 RX ADMIN — IPRATROPIUM BROMIDE AND ALBUTEROL SULFATE 3 ML: .5; 3 SOLUTION RESPIRATORY (INHALATION) at 16:50

## 2019-01-01 RX ADMIN — HYDRALAZINE HYDROCHLORIDE 10 MG: 20 INJECTION, SOLUTION INTRAMUSCULAR; INTRAVENOUS at 14:11

## 2019-01-01 RX ADMIN — ESMOLOL HYDROCHLORIDE 20 MG: 10 INJECTION, SOLUTION INTRAVENOUS at 02:40

## 2019-01-01 RX ADMIN — HYDRALAZINE HYDROCHLORIDE 20 MG: 20 INJECTION INTRAMUSCULAR; INTRAVENOUS at 16:27

## 2019-01-01 ASSESSMENT — ACTIVITIES OF DAILY LIVING (ADL)
ADLS_ACUITY_SCORE: 18
ADLS_ACUITY_SCORE: 18
RETIRED_COMMUNICATION: 0-->UNDERSTANDS/COMMUNICATES WITHOUT DIFFICULTY
ADLS_ACUITY_SCORE: 18
COGNITION: 0 - NO COGNITION ISSUES REPORTED
ADLS_ACUITY_SCORE: 17
ADLS_ACUITY_SCORE: 18
ADLS_ACUITY_SCORE: 20
ADLS_ACUITY_SCORE: 19
ADLS_ACUITY_SCORE: 18
ADLS_ACUITY_SCORE: 18
BATHING: 0-->INDEPENDENT
TRANSFERRING: 1-->ASSISTIVE EQUIPMENT
ADLS_ACUITY_SCORE: 21
ADLS_ACUITY_SCORE: 18
DRESS: 0-->INDEPENDENT
ADLS_ACUITY_SCORE: 18
ADLS_ACUITY_SCORE: 18
ADLS_ACUITY_SCORE: 21
ADLS_ACUITY_SCORE: 19
WHICH_OF_THE_ABOVE_FUNCTIONAL_RISKS_HAD_A_RECENT_ONSET_OR_CHANGE?: AMBULATION;TRANSFERRING;TOILETING
SWALLOWING: 0-->SWALLOWS FOODS/LIQUIDS WITHOUT DIFFICULTY
ADLS_ACUITY_SCORE: 18
RETIRED_EATING: 0-->INDEPENDENT
AMBULATION: 1-->ASSISTIVE EQUIPMENT
ADLS_ACUITY_SCORE: 18
TOILETING: 2-->ASSISTIVE PERSON
PRIOR_FUNCTIONAL_LEVEL_COMMENT: INDEPENDENT
ADLS_ACUITY_SCORE: 19
FALL_HISTORY_WITHIN_LAST_SIX_MONTHS: NO
ADLS_ACUITY_SCORE: 21
ADLS_ACUITY_SCORE: 19

## 2019-01-01 ASSESSMENT — ENCOUNTER SYMPTOMS
COLOR CHANGE: 0
ARTHRALGIAS: 0
CONFUSION: 0
NECK STIFFNESS: 0
HEADACHES: 0
EYE REDNESS: 0
SHORTNESS OF BREATH: 0
ABDOMINAL PAIN: 1
FEVER: 0
DIFFICULTY URINATING: 0
FLANK PAIN: 1

## 2019-01-01 ASSESSMENT — PAIN DESCRIPTION - DESCRIPTORS
DESCRIPTORS: ACHING

## 2019-01-01 ASSESSMENT — MIFFLIN-ST. JEOR
SCORE: 1604.25
SCORE: 1682.25

## 2019-01-01 ASSESSMENT — COPD QUESTIONNAIRES: COPD: 1

## 2019-04-29 PROBLEM — I71.30 RUPTURED ABDOMINAL AORTIC ANEURYSM (AAA) (H): Status: ACTIVE | Noted: 2019-01-01

## 2019-04-29 NOTE — PLAN OF CARE
Admitted/transferred from: OR  Reason for admission/transfer: Hemodynamic monitoring  Patient status upon admission/transfer:   Interventions: VSS, lethargic, Oriented  Plan: R groin site, CMS and hemodynamic monitoring.   2 RN skin assessment: completed by Casa Golden  Result of skin assessment and interventions/actions: R groin site, skin dry/flaky, bruising and scars present  Height, weight, drug calc weight: done  Patient belongings:   MDRO education (if applicable):

## 2019-04-29 NOTE — PLAN OF CARE
Pt admitted to  @0520 for hemodynamic monitoring s/p stent placement   D/I/A  Neuro: lethargic, arousable, oriented X4, intact, PERLLA, BURTON, strength equal, denies N/T  CV: NSR, L art line, esmolol @100 to maintain SBP <120, Lower extremities cool, pulses dopplerable  Pulm: 6L oxymask, LS coarse  GI: NPO, Emesis X1- PRN zofran given, No BM  : lind, adequate output  Skin: R groin site WDL  Access: R internal jugular, PIV X2  Gtts: esmolol, LR MIVF @125    Plan: continue to monitor, Notify MD of changes and concerns.

## 2019-04-29 NOTE — BRIEF OP NOTE
[facility], [place]    Brief Operative Note    Pre-operative diagnosis: ruptured abdominal aortic aneurysm  Post-operative diagnosis ruptured abdominal aortic aneurysm  Procedure: Procedure(s):  Endovascular repair aneurysm abdominal aorta, ANNGIOGRAM, CLOSURES OF LARGE BORE ACCESS RIGHT GROIN  Surgeon: Surgeon(s) and Role:     * Becca Power MD - Primary  Anesthesia: General   Estimated blood loss: Less than 50 ml  Drains: None  Specimens: * No specimens in log *  Findings:   Type IB endoleak in R iliac limb, no endoleak on completion, R  hypogastric preserved, R DP palpable.  Complications: None.  Implants:    Implant Name Type Inv. Item Serial No.  Lot No. LRB No. Used   STENT GRAFT ENDURANT II CONTRALATERAL LIMB [id number] Graft STENT GRAFT ENDURANT II CONTRALATERAL LIMB [id number] [employer]  N/A 1

## 2019-04-29 NOTE — PLAN OF CARE
D/I/A: A&Ox4, no neuro deficits. HR NSR 60-80s. Esmolol gtt titrated 0 -100 mcg/kg/min to keep SBP < 120, also gave prn labetalol 20 mg x2 and prn hydralazine 10 mg x2. NC 4L. Clear liquid diet, emesis x1, gave 4 mg zofran and 5 mg compazine, nausea resolved after. R groin site unchanged/WDL, doppler pedal pulses.  P: Wean esmolol gtt, maintain SBP < 120, wean O2, continue with plan of care and update MD with changes.    Pulled left PIV at 1800 d/t bleeding, held pressure for about 2 minutes until IV clotted off and applied dressing. Went back in pts room at 1900 and IV was bleeding w/ large amount of blood in patients bed. Applied stat seal and held pressure until bleeding stopped, MD at bedside, heidy INR, fibrinogen, PTT and hgb labs. BP and HR stable, will continue to monitor.

## 2019-04-29 NOTE — H&P
SURGICAL ICU H&P  April 29, 2019      CO-MORBIDITIES:   No diagnosis found.    ASSESSMENT: Kai Carreon is a 78 year old male POD # 0 s/p endovascular repair for type I endoleak from R iliac stent graft.  Admitted to SICU postoperatively for hemodynamic monitoring.    PLAN:  Neuro/ pain/ sedation:  - Monitor neurological status; notify the MD for any acute changes in exam  - tylenol prn for pain.    Pulmonary care:  #Hx COPD   - Supplemental oxygen to keep saturation above 92%  - Incentive spirometer [id number] when awake  - Duonebs Q4    Cardiovascular:  # Type I endoleak from iliac stent graft    - Monitor hemodynamic status  - wean esmolol gtt  - labetalol prn  - SBP<160  - CMS checks    GI care:   - NPO except medications    Fluids/ Electrolytes/ Nutrition:   - LR at 125cc/hr for IV fluid hydration  - No indication for parenteral nutrition    Renal/ Fluid Balance:    - Urine output is  adequate so far  - Will continue to monitor intake and output  - Betancourt    Endocrine:    - No management indication     ID/ Antibiotics:  - Perioperative ancef    Heme:  #Coagulopathy     - Hemoglobin stable   - Trend coags and correct coagulopathy    MSK:    Prophylaxis:    - Mechanical prophylaxis for DVT     Lines/ tubes/ drains:  - A line, MAC CVC, PIVs, Betancourt    Disposition:  - Surgical ICU    Patient seen, findings and plan discussed with surgical ICU staff Dr. Whittington.    Shanell Abad  General Surgery PGY2    - - - - - - - - - - - - - - - - - - - - - - - - - - - - - - - - - - - - - - - - - - - - - - - - - - - - - - - - -     PRIMARY TEAM: Vascular Surgery  PRIMARY PHYSICIAN: Dr. Power    REASON FOR CRITICAL CARE ADMISSION: Hemodynamic monitoring   ADMITTING PHYSICIAN: Dr. Whittington    HISTORY PRESENTING ILLNESS: Kai Carreon is a 78 year old male with PMH COPD, HTN who presented to the ED as a transfer from [facility] due to concern for impending rupture of his aortic aneurysm.  He had a complex graft repair of his aortic  aneurysm on 8/21/18.  Several days ago he developed worsening right leg pain and yesterday he began having severe RLQ pain.  On admission he endorses significant pain in RLQ and right flank worst when moving, however resolved when he stays still.  He denies chest pain, SOB, fevers, or chills.  Repeat imaging in ED concerning for type I endoleak at right common iliac stent as well as right psoas hematoma.  He was also found to be coagulopathic and thrombocytopenic, and received plts and FFP in the ED.  He was then taken to the OR emergently for repair.    REVIEW OF SYSTEMS: As noted above. No headache, dizziness. No fever, chills. No chest pain or shortness of breath. No abdominal pain. No diarrhea or constipation. No urinary difficulties. No muscle or body aches.     PAST MEDICAL HISTORY:   HTN  Aortic aneurysm    SURGICAL HISTORY:   Endograft    SOCIAL HISTORY:    Former Smoker, quit 1 y ago  No ETOH    FAMILY HISTORY:  No family history on file.     ALLERGIES:    No Known Allergies    MEDICATIONS:  Reviewed    PHYSICAL EXAMINATION:  Temp:  [96.9  F (36.1  C)-97.9  F (36.6  C)] 97.9  F (36.6  C)  Pulse:  [67-85] 68  Heart Rate:  [65-85] 80  Resp:  [16-26] 19  BP: (110-182)/() 112/76  FiO2 (%):  [9 %] 9 %  SpO2:  [94 %-100 %] 100 %  General: Alert, well-appearing in no acute distress.  HEENT: Normocephalic, atraumatic.   Neck: No cervical lymphadenopathy.   Chest wall: Symmetric thorax. No masses or tenderness to palpation.   Respiratory: Non-labored breathing. Audible wheezing  Cardiovascular: Regular rate and rhythm.   Gastrointestinal: Abdomen soft, mildly distended.   Extremities: No limb deformities. No pedal edema. 2+ DP and PT pulses bilaterally  Skin: As noted above. No rashes or lesions appreciated.    LABS: Reviewed.   Arterial Blood Gases   Recent Labs   Lab 04/29/19  0354 04/29/19  0300   PH 7.35 7.35   PCO2 47* 45   PO2 166* 197*   HCO3 26 25     Complete Blood Count   Recent Labs   Lab  04/29/19 0354 04/29/19 0300 04/28/19 2349 04/28/19 2348   WBC 5.5  --  8.4  --    HGB 7.0*  7.5* 8.6* 10.0* 10.5*   PLT 82* 66* 38*  --      Basic Metabolic Panel  Recent Labs   Lab 04/29/19 0354 04/29/19 0300 04/28/19 2349 04/28/19 2348    137 134 137   POTASSIUM 4.8 4.9 4.7 4.9   CHLORIDE  --   --  106  --    CO2  --   --  26  --    BUN  --   --  35*  --    CR  --   --  1.10  --    * 153* 131* 131*     Liver Function Tests  Recent Labs   Lab 04/29/19 0354 04/29/19 0300 04/28/19 2349   AST  --   --  35   ALT  --   --  33   ALKPHOS  --   --  96   BILITOTAL  --   --  0.4   ALBUMIN  --   --  3.1*   INR 1.48* 1.96* 3.00*     Pancreatic Enzymes  Recent Labs   Lab 04/28/19 2349   LIPASE 80     Coagulation Profile  Recent Labs   Lab 04/29/19 0354 04/29/19 0300 04/28/19 2349   INR 1.48* 1.96* 3.00*   PTT 41* 49* 62*     Lactate  Invalid input(s): LACTATE    IMAGING:  Results for orders placed or performed during the hospital encounter of 04/28/19   CTA Abdomen Pelvis with Contrast    Narrative    1. Post-procedural changes of aortic aneurysm repair with Manilof  endograft. Progressively increased contrast collection within the  aneurysmal sac with focal contrast in the lateral aspect of the right  common iliac stent graft are highly concerning for the endoleak likely  arising from the stent failure of the distal right common iliac stent  graft (endoleak type I B).  2. Asymmetrically enlarged right psoas muscle with right hemo  retroperitoneum are concerning for psoas hematoma.  3. Normal appendix.

## 2019-04-29 NOTE — PROGRESS NOTES
VASCULAR SURGERY CLINIC CONSULTATION    VASCULAR SURGEON: Becca Power MD    LOCATION:  [facility]    Kai Carreon   Medical Record #:  [id number]  YOB: 1940  Age:  78 year old     Date of Service: 4/28/2019    PRIMARY CARE PROVIDER: No primary care provider on file.      Reason for visit: Aortic aneurysm with abdominal pain    IMPRESSION: Significant right lower quadrant abdominal pain and evidence of fat stranding in this area on CT scan.  Complex Manifold thoracoabdominal endograft with evidence of type II endoleak.  Stranding in the RLQ with psoas enlargement for which DDx includes rupture.  Profoundly coagulopathic with spontaneously elevated INR to 3, extremely low fibrinogen levels, and advanced thrombocytopenia.  Hemodynamically completely stable and hemoglobin has been stable as well.  Does not have leonid peritonitis.  On further evaluation of the reconstructed images, there may be a distal type 1 endoleak as the source of rupture.  Short distance to iliac bifurcation, but may be able to simply extend.      RECOMMENDATION: Complicated situation.  Not a candidate for open TAAA repair or graft explant.  Clarified this with both the patient and his family.  They want an attempt at repair if this can be managed with endograft salvage.  I think there is a possibility of this.  May still need to perform laparotomy, either for internal iliac bypass to limit paraplegia risk, or for defect repair and sac thrombosis procedure.    HPI:  Kai Carreon is a 78 year old male who was seen today in consultation for abdominal pain and a known thoracoabdominal aneurysm which is undergone endovascular repair with a medical technique in [place] in August of last year.  The patient was a smoker until a year ago but had not had any prior surgeries and was generally healthy.  Over the last 3 days he has stopped walking much due to complaints in his right hip.  According to the  wife he is also been staying in bed the last 3 days and covering himself with blankets suggesting that he was feeling unwell.  That said he is been eating well and has not been complaining of nausea.  Around 7 PM he started to develop severe pain in his right lower quadrant.  He presented to [facility] ER where CT scan was done and he was transferred here because of the concern for possible impending rupture of his aortic aneurysm.    The patient is communicative and alert when I see him in the ER.    No prior cardiac disease.    REVIEW OF SYSTEMS:    A 12 point ROS was reviewed and except for what is listed in the HPI above, all others are negative    PHH:  No past medical history on file.   No past surgical history on file.    ALLERGIES:  No Known Allergies    MEDS:    Current Facility-Administered Medications:      esmolol 2000 mg in sodium chloride 0.9% 100 mL infusion,  mcg/kg/min, Intravenous, Continuous, Ryder Mijares DO, Last Rate: 55.6 mL/hr at 04/29/19 0010, 200 mcg/kg/min at 04/29/19 0010     iopamidol (ISOVUE-370) solution 100 mL, 100 mL, Intravenous, Once, Ryder Mijares DO     sodium chloride 0.9 % bag 500mL for CT scan flush use, 100 mL, Intravenous, Once, Ryder Mijares DO  No current outpatient medications on file.    SOCIAL HABITS:   Social History    Substance and Sexual Activity      Alcohol use: Not on file      History   Drug Use Not on file      History   Smoking Status     Not on file   Smokeless Tobacco     Not on file        FAMILY HISTORY:  No family history on file.    PE:  /70   Pulse 74   Temp 97.3  F (36.3  C) (Oral)   Resp 21   Wt 92.6 kg (204 lb 2.3 oz)   SpO2 95%   Wt Readings from Last 1 Encounters:   04/28/19 92.6 kg (204 lb 2.3 oz)     There is no height or weight on file to calculate BMI.    EXAM:  GENERAL: This is a well-developed 78 year old male who appears his stated age  EYES: Grossly normal.  MOUTH: Buccal mucosa normal    GASTROINTESINAL (ABDOMEN): Soft, significant discomfort when he moves his right leg but otherwise no pain.  Only no guarding.  MUSCULOSKELETAL: Grossly normal and both lower extremities are intact.  HEME/LYMPH: No lymphedema  NEUROLOGIC: Focally intact, Alert and oriented x 3.   PSYCH: appropriate affect  INTEGUMENT: No open lesions or ulcers            DIAGNOSTIC STUDIES:     Images:  No results found.    I personally reviewed the images and my interpretation is that a CT scan at [facility] shows a Manifold endograft with significant type II endoleak.  No leonid rupture of aorta, but fat stranding more prominent on [facility] scan.  The right lower quadrant has fat stranding and a significantly inflamed right psoas muscle which is indeed adjacent to his iliac graft limb..    LABS:      Sodium   Date Value Ref Range Status   04/28/2019 134 133 - 144 mmol/L Final   04/28/2019 137 133 - 144 mmol/L Final     Urea Nitrogen   Date Value Ref Range Status   04/28/2019 35 (H) 7 - 30 mg/dL Final     Hemoglobin   Date Value Ref Range Status   04/28/2019 10.0 (L) 13.3 - 17.7 g/dL Final   04/28/2019 10.5 (L) 13.3 - 17.7 g/dL Final     Platelet Count   Date Value Ref Range Status   04/28/2019 38 (LL) 150 - 450 10e9/L Final     Comment:     This result has been called to DEEDEE RAHMAN by Kelsey Hoen on 04 29 2019 at   0018, and has been read back.        INR   Date Value Ref Range Status   04/28/2019 3.00 (H) 0.86 - 1.14 Final       Total time spent 70 minutes face to face with patient with more than 50% time spent in counseling and coordination of care.    Becca Power MD  VASCULAR SURGERY

## 2019-04-29 NOTE — PROGRESS NOTES
SURGICAL ICU PROGRESS NOTE  April 29, 2019        Date of Service (when I saw the patient): 04/29/2019    ASSESSMENT:  Kai Carreon is a 78 year old male with Hx of HTN and COPD who is POD #0 s/p endovascular repair for type I endoleak from R iliac stent graft. Admitted to SICU postoperatively for hemodynamic monitoring.    CHANGES and MAJOR THINGS TODAY:   - Wean off esmolol if (SBP >120)  - Labetalol prn  - Restart antibiotics  - Advance diet to clears  - Discontinue maintainence fluids once able to tolerate PO  - Restart PTA medications    PLAN:  Neuro/ pain/ sedation:  - Monitor neurological status; notify the MD for any acute changes in exam  - tylenol prn for pain.     Pulmonary care:  #Hx COPD   - Supplemental oxygen to keep saturation above 92%, continue to wean  - Incentive spirometer [id number] when awake  - Pulmonary toilet  - Duonebs Q6, Breo Ellipta QD     Cardiovascular:  # Type I endoleak from iliac stent graft    - Monitor hemodynamic status  - wean esmolol gtt as tolerated for SBP<120  - labetalol prn  - Restart PTA Atenolol 25mg QHS  - SBP<120  - CMS checks     GI care:   - Advance diet as tolerated; clears     Fluids/ Electrolytes/ Nutrition:   - LR at 50cc/hr for IV fluid hydration, plan for discontinue once taking adequate PO intake  - No indication for parenteral nutrition    Renal/ Fluid Balance:    - Urine output is adequate so far  - Will continue to monitor intake and output  - Betancourt     Endocrine:    - No management indication      ID/ Antibiotics:  - Perioperative ancef, continue post-op prophylaxis      Heme:  #Coagulopathy     - Hemoglobin stable, 8.5 today  - Trend coags and correct coagulopathy     MSK:  - Continue bedrest until evening per vascular     Prophylaxis:    - Mechanical prophylaxis for DVT      Lines/ tubes/ drains:  - A line, MAC CVC, PIVs, Betancourt     Disposition:  - Surgical ICU, possible floor status tomorrow     Patient seen, findings and plan discussed with surgical  ICU staff Dr. Harpal JORGE Anesthesiology       ====================================  INTERVAL HISTORY:   Pt admitted to SICU overnight s/p endovascular repair of type I endoleak. Pt doing well this AM, no complaints at this time. No events reported per nursing staff.     OBJECTIVE:   1. VITAL SIGNS:   Temp:  [96.7  F (35.9  C)-97.9  F (36.6  C)] 96.7  F (35.9  C)  Pulse:  [67-85] 68  Heart Rate:  [63-85] 67  Resp:  [16-26] 18  BP: (110-182)/() 145/89  MAP:  [81 mmHg-95 mmHg] 87 mmHg  Arterial Line BP: (113-129)/(61-77) 118/65  FiO2 (%):  [9 %] 9 %  SpO2:  [94 %-100 %] 99 %  FiO2 (%): 9 %  Resp: 18      2. INTAKE/ OUTPUT:   I/O last 3 completed shifts:  In: 2473.17 [I.V.:125]  Out: 380 [Urine:350; Blood:30]    3. PHYSICAL EXAMINATION:  General: NAD, resting comfortably in bed  Pulm/Resp: Clear breath sounds bilaterally without rhonchi, crackles or wheeze,breathing non-labored  CV: Regular rate and rhythm, no murmurs appreciated  Abdomen: Abdomen soft, non-distended, no rebound or guarding.  : Betancourt catheter in place, urine yellow and clear  MSK/Extremities: warm and well perfused, peripheral pulses intact.   Skin: No rashes or lesions appreciated.    4. INVESTIGATIONS:   Arterial Blood Gases   Recent Labs   Lab 04/29/19  0354 04/29/19  0300   PH 7.35 7.35   PCO2 47* 45   PO2 166* 197*   HCO3 26 25     Complete Blood Count   Recent Labs   Lab 04/29/19  0550 04/29/19  0354 04/29/19  0300 04/28/19  2349   WBC 8.1 5.5  --  8.4   HGB 8.5* 7.0*  7.5* 8.6* 10.0*   PLT 85* 82* 66* 38*     Basic Metabolic Panel  Recent Labs   Lab 04/29/19  0550 04/29/19  0354 04/29/19  0300 04/28/19  2349    139 137 134   POTASSIUM 4.4 4.8 4.9 4.7   CHLORIDE 104  --   --  106   CO2 24  --   --  26   BUN 30  --   --  35*   CR 1.05  --   --  1.10   * 143* 153* 131*     Liver Function Tests  Recent Labs   Lab 04/29/19  0550 04/29/19  0354 04/29/19  0300 04/28/19  2349   AST  --   --   --  35   ALT   --   --   --  33   ALKPHOS  --   --   --  96   BILITOTAL  --   --   --  0.4   ALBUMIN  --   --   --  3.1*   INR 1.44* 1.48* 1.96* 3.00*     Pancreatic Enzymes  Recent Labs   Lab 04/28/19  2349   LIPASE 80     Coagulation Profile  Recent Labs   Lab 04/29/19  0550 04/29/19  0354 04/29/19  0300 04/28/19  2349   INR 1.44* 1.48* 1.96* 3.00*   PTT 44* 41* 49* 62*         5. RADIOLOGY:   Recent Results (from the past 24 hour(s))   CTA Abdomen Pelvis with Contrast    Narrative    1. Post-procedural changes of aortic aneurysm repair with Manilof  endograft. Progressively increased contrast collection within the  aneurysmal sac with focal contrast in the lateral aspect of the right  common iliac stent graft are highly concerning for the endoleak likely  arising from the stent failure of the distal right common iliac stent  graft (endoleak type I B).  2. Asymmetrically enlarged right psoas muscle with right hemo  retroperitoneum are concerning for psoas hematoma.  3. Normal appendix.   IR OR Angiogram    Narrative    This exam was marked as non-reportable because it will not be read by a   radiologist or a [place] non-radiologist provider.                 =========================================

## 2019-04-29 NOTE — ANESTHESIA PROCEDURE NOTES
Arterial Line Procedure Note  Staff:     Anesthesiologist:  Jayla Euceda MD    Resident/CRNA:  Robin Alex MD    Arterial line performed by resident/CRNA in presence of a teaching physician    Location: In OR Before Induction  Procedure Start/Stop Times:     patient identified, IV checked, site marked, risks and benefits discussed, informed consent, monitors and equipment checked, pre-op evaluation and at physician/surgeon's request      Correct Patient: Yes      Correct Position: Yes      Correct Site: Yes      Correct Procedure: Yes      Correct Laterality:  Yes    Site Marked:  Yes  Line Placement:     Procedure:  Arterial Line    Insertion Site:  Radial    Insertion laterality:  Left    Skin Prep: Chloraprep      Patient Prep: mask, sterile gloves, hat and hand hygiene      Local skin infiltration:  1% lidocaine    amount (mL):  1    Ultrasound Guided?: Yes      Artery evaluated via ultrasound confirming patency.   Using realtime imaging, the artery was punctured and the needle was observed entering the artery.      Catheter size:  20 gauge, Quick cath    Dressing:  Tegaderm    Complications:  None obvious    Arterial waveform: Yes      IBP within 10% of NIBP: Yes

## 2019-04-29 NOTE — ANESTHESIA CARE TRANSFER NOTE
Patient: Kai Velma    Procedure(s):  Endovascular repair aneurysm abdominal aorta, ANNGIOGRAM, INSERT  STENT GRAFT ENDURANT II CONTRALATERAL LIMB [id number], CLOSURE OF LARGE BORE ACCESS RIGHT GROIN    Diagnosis: ruptured abdominal aortic aneurysm  Diagnosis Additional Information: No value filed.    Anesthesia Type:   No value filed.     Note:  Airway :Face Mask  Patient transferred to:ICU  Comments: VS STABLE, AWAKE, TRANSPORTED TO ICU. REPORT RN      Vitals: (Last set prior to Anesthesia Care Transfer)    CRNA VITALS  4/29/2019 0438 - 4/29/2019 0528      4/29/2019             EKG:  Sinus rhythm                Electronically Signed By: Lindsey Lino  April 29, 2019  5:28 AM

## 2019-04-29 NOTE — ED PROVIDER NOTES
History     Chief Complaint   Patient presents with     Abdominal Pain     HPI  Kai Carreon is a 78 year old male who presents to the Emergency Department today from an outside facility for further management of known AAA with endovascular repair in November of 2018 with concern for leak. The patient states that today he started experiencing right sided abdominal pain, right flank pain and radiates into his right leg. The patient then presented to [facility] ED where he had a CT performed which revealed a AAA with leak. The patient was then transferred to our hospital for further management.    At [facility] he was also found to have a d-dimer of greater than 20, fibrinogen less than 60, APTT 44, INR 2.06 hemoglobin of 9.9 and a platelets of 51.    Per EMS there was no events in route.     Code aorta was called upon arrival.      I have reviewed the Medications, Allergies, Past Medical and Surgical History, and Social History in the Epic system.    No past medical history on file.    No past surgical history on file.    No family history on file.    Social History     Tobacco Use     Smoking status: Not on file   Substance Use Topics     Alcohol use: Not on file     Current Facility-Administered Medications   Medication     esmolol (BREVIBLOC) 20 mg/mL infusion PEDS/NICU LESS than 45 kg     No current outpatient medications on file.      No Known Allergies     Review of Systems   Constitutional: Negative for fever.   HENT: Negative for congestion.    Eyes: Negative for redness.   Respiratory: Negative for shortness of breath.    Cardiovascular: Negative for chest pain.   Gastrointestinal: Positive for abdominal pain.   Genitourinary: Positive for flank pain. Negative for difficulty urinating.   Musculoskeletal: Negative for arthralgias and neck stiffness.   Skin: Negative for color change.   Neurological: Negative for headaches.   Psychiatric/Behavioral: Negative for confusion.     Physical Exam          Physical  Exam   Constitutional: He is oriented to person, place, and time. He appears well-developed and well-nourished. No distress.   HENT:   Head: Normocephalic and atraumatic.   Mouth/Throat: No oropharyngeal exudate.   Eyes: Pupils are equal, round, and reactive to light. Right eye exhibits no discharge. Left eye exhibits no discharge. No scleral icterus.   Neck: Normal range of motion. Neck supple.   Cardiovascular: Normal rate, regular rhythm, normal heart sounds and intact distal pulses. Exam reveals no gallop and no friction rub.   No murmur heard.  Equal pulses bilaterally in upper and lower extremities.   Pulmonary/Chest: Effort normal and breath sounds normal. No respiratory distress. He has no wheezes. He exhibits no tenderness.   Abdominal: Soft. Bowel sounds are normal. He exhibits no distension. There is tenderness in the right lower quadrant.   Musculoskeletal: Normal range of motion. He exhibits no edema, tenderness or deformity.   Neurological: He is alert and oriented to person, place, and time. No cranial nerve deficit.   Skin: Skin is warm and dry. No rash noted. He is not diaphoretic. No erythema. No pallor.   Psychiatric: He has a normal mood and affect.   Nursing note and vitals reviewed.      ED Course   11:40 PM  The patient was seen and examined by Dr. Mijares in Room 02.       Procedures             EKG Interpretation:      Interpreted by Ryder Mijares  Time reviewed: 2344  Symptoms at time of EKG: abdominal/flank pain   Rhythm: normal sinus   Rate: 85  Axis: Normal  Ectopy: none  Conduction: normal  ST Segments/ T Waves: No ST-T wave changes  Q Waves: none  Comparison to prior: No old EKG available    Clinical Impression: normal EKG                Critical Care time:  was 30 minutes for this patient excluding procedures.             Labs Ordered and Resulted from Time of ED Arrival Up to the Time of Departure from the ED - No data to display         Assessments & Plan (with Medical Decision  Making)   This is an 78-year-old male with a history of AAA with repair November with concern for leak.  Patient had normal pain while at rest today.  At outside hospital patient had a CT scan which was concerning for leak.  He was transferred to this facility and code aorta was called.  On exam he did have some abdominal tenderness, pulses were equal.  He was hypertensive initially and esmolol was started which resolved his hypertension.  Work showed an INR of 3, a d-dimer of greater than 20, PTT of 62 and platelets of 38.  Patient was seen by vascular surgery.  Platelets, FFP, and cryoprecipitate were ordered and started transfusing.  We obtained a repeat CT which shows likely worsening of his leak.  It was determined that patient will be taken to the OR with vascular surgery.    I have reviewed the nursing notes.    I have reviewed the findings, diagnosis, plan and need for follow up with the patient.     Medication List      There are no discharge medications for this visit.       Final diagnoses:   None   Alexis SMITH, am serving as a trained medical scribe to document services personally performed by Ryder Mijares DO, based on the provider's statements to me.   Ryder SMITH DO, was physically present and have reviewed and verified the accuracy of this note documented by Alexis Meek.     4/28/2019   [facility], [place], EMERGENCY DEPARTMENT     Ryder Mijares DO  04/30/19 6177

## 2019-04-29 NOTE — PROGRESS NOTES
VASCULAR SURGERY PROGRESS NOTE    SUBJECTIVE:  Patient laying in bed, sleepy, Zoila on command, denies right hip pain.  Family at bedside       OBJECTIVE:  Vital signs:  Temp: 97.9  F (36.6  C) Temp src: Oral BP: 112/76 Pulse: 68 Heart Rate: 80 Resp: 19 SpO2: 100 % O2 Device: Oxi Plus        Intake/Output Summary (Last 24 hours) at 4/29/2019 0725  Last data filed at 4/29/2019 0522  Gross per 24 hour   Intake 2348.17 ml   Output 230 ml   Net 2118.17 ml       Vitals:    04/28/19 2345   Weight: 92.6 kg (204 lb 2.3 oz)       PHYSICAL EXAM:  NEURO/PSYCH: The patient is alert and oriented.  Appropriate.  Moves all extremities.    SKIN:  Warm and dry.  PULMONARY: non-labored breathing   EXTREMITIES: right groin puncture site soft, without hematoma, doppler signals in bilateral DP and PT pulses       BMP  Recent Labs   Lab 04/29/19  0550 04/29/19  0354 04/29/19  0300 04/28/19 2349    139 137 134   POTASSIUM 4.4 4.8 4.9 4.7   CHLORIDE 104  --   --  106   CO2 24  --   --  26   BUN 30  --   --  35*   CR 1.05  --   --  1.10   * 143* 153* 131*     CBC  Recent Labs   Lab 04/29/19  0550 04/29/19  0354 04/29/19  0300 04/28/19 2349   WBC 8.1 5.5  --  8.4   HGB 8.5* 7.0*  7.5* 8.6* 10.0*   PLT 85* 82* 66* 38*     INR/PTT  Recent Labs   Lab 04/29/19  0550 04/29/19  0354 04/29/19  0300 04/28/19 2349   INR 1.44* 1.48* 1.96* 3.00*   PTT 44* 41* 49* 62*     ABG  Recent Labs   Lab 04/29/19  0354 04/29/19  0300   PH 7.35 7.35   PCO2 47* 45   PO2 166* 197*   HCO3 26 25     LFT  Recent Labs   Lab 04/28/19 2349   AST 35   ALT 33   ALKPHOS 96   BILITOTAL 0.4   ALBUMIN 3.1*     Pancreas  Recent Labs   Lab 04/28/19  2349   LIPASE 80         ASSESSMENT:  78 year old male with past medical history remarkable for COPD, HTN and thoracoabdominal endovascular aneurysm repair in August 2018 with stenting to celiac, SMA and bilateral renals in [place] who had a known type II endoleak who presented to an OSH on 4/28/2019 with 3 days  history of severe right hip pain and CT scan demonstrated rupture.  On 4/29/2019, he underwent endovascular repair for type 1 endoleak from right iliac stent graft with Dr. Power.           PLAN:  - wean esmolol , start home BP medications.  SBP goal less than 120  - continue neurovascular checks  - bedrest for 4 hours  - NPO this morning, start clears this afternoon if stable  - keep in ICU today        Karly GUERRA, CNS  Division of Vascular Surgery  [facility]    Pager [phone]  Office [phone]

## 2019-04-29 NOTE — ANESTHESIA PREPROCEDURE EVALUATION
Anesthesia Pre-Procedure Evaluation    Patient: Kai Carreon   MRN:     [id number] Gender:   male   Age:    78 year old :      1940        Preoperative Diagnosis: ruptured abdominal aortic aneurysm   Procedure(s):  REPAIR, ANEURYSM ABDOMINAL AORTA     No past medical history on file.   No past surgical history on file.       Anesthesia Evaluation     . Pt has had prior anesthetic.            ROS/MED HX    ENT/Pulmonary:     (+)COPD, , . .    Neurologic:       Cardiovascular: Comment: AAA with endovascular repair in 2018 with concern for leak.         METS/Exercise Tolerance:     Hematologic: Comments: Profoundly coagulopathic with spontaneously elevated INR to 3, extremely low fibrinogen levels, and advanced thrombocytopenia.         Musculoskeletal:         GI/Hepatic:         Renal/Genitourinary:         Endo:         Psychiatric:         Infectious Disease:         Malignancy:         Other:                         PHYSICAL EXAM:   Mental Status/Neuro: A/A/O   Airway:   Mallampati: II  Mouth/Opening: Full  TM distance: > 6 cm  Neck ROM: Limited   Respiratory: Auscultation: Wheezing     Resp. Rate: Normal     Resp. Effort: Normal      CV:    Comments:                    Lab Results   Component Value Date    WBC 8.4 2019    HGB 10.0 (L) 2019    HCT 31.1 (L) 2019    PLT 38 (LL) 2019     2019    POTASSIUM 4.7 2019    CHLORIDE 106 2019    CO2 26 2019    BUN 35 (H) 2019    CR 1.10 2019     (H) 2019    PRASHANTH 8.6 2019    ALBUMIN 3.1 (L) 2019    PROTTOTAL 7.3 2019    ALT 33 2019    AST 35 2019    ALKPHOS 96 2019    BILITOTAL 0.4 2019    LIPASE 80 2019    PTT 62 (H) 2019    INR 3.00 (H) 2019       Preop Vitals  BP Readings from Last 3 Encounters:   19 121/76    Pulse Readings from Last 3 Encounters:   19 72      Resp Readings from Last 3 Encounters:    04/29/19 22    SpO2 Readings from Last 3 Encounters:   04/29/19 94%      Temp Readings from Last 1 Encounters:   04/29/19 36.4  C (97.6  F) (Oral)    Ht Readings from Last 1 Encounters:   No data found for Ht      Wt Readings from Last 1 Encounters:   04/28/19 92.6 kg (204 lb 2.3 oz)    There is no height or weight on file to calculate BMI.     LDA:  Peripheral IV 04/28/19 Right Upper forearm (Active)   Number of days: 1       Peripheral IV 04/28/19 (Active)   Number of days: 1       Urethral Catheter Temperature probe 16 fr (Active)   Number of days: 0            Assessment:   ASA SCORE: 4 emergent     NPO Status: > 6 hours since completed Solid Foods   Documentation: Deferred   Proceeding: Proceed without further delay  Tobacco Use:  NO Active use of Tobacco/UNKNOWN Tobacco use status     Plan:   Anes. Type:  General   Pre-Induction: None     Fluid/Blood-Preparation: Blood in Room; PRBC; FFP; Hot Line; Albumin; Cell Saver; Cryo; PLT     Drips/Meds-Preparation: Phenylephrine; Nicardipine; Norepi   Induction:  IV (Standard)   Airway: Oral ETT   Access/Monitoring: PIV; 2nd PIV; A-Line; US     Advanced Access: CVL by anesthesia   Maintenance: Balanced   Emergence: Postop SECURED AIRWAY likely   Logistics: ICU Admission     Postop Pain/Sedation Strategy:  Standard-Options: Opioids PRN     PONV Management:  Adult Risk Factors:, Non-Smoker, Postop Opioids  Prevention: Ondansetron     CONSENT: Direct conversation   Plan and risks discussed with: Patient   Blood Products: Consented (ALL Blood Products)                         Robin Alex MD

## 2019-04-29 NOTE — ANESTHESIA POSTPROCEDURE EVALUATION
Anesthesia POST Procedure Evaluation    Patient: Kai Carreon   MRN:     [id number] Gender:   male   Age:    78 year old :      1940        Preoperative Diagnosis: ruptured abdominal aortic aneurysm   Procedure(s):  Endovascular repair aneurysm abdominal aorta, ANNGIOGRAM, INSERT  STENT GRAFT ENDURANT II CONTRALATERAL LIMB [id number], CLOSURE OF LARGE BORE ACCESS RIGHT GROIN   Postop Comments: No value filed.       Anesthesia Type:  General  No value filed.    Reportable Event: NO     PAIN: Uncomplicated   Sign Out status: Comfortable, Well controlled pain     PONV: No PONV   Sign Out status:  No Nausea or Vomiting     Neuro/Psych: Uneventful perioperative course   Sign Out Status: Preoperative baseline     Airway/Resp.: Uneventful perioperative course   Sign Out Status: Resp. Status within EXPECTED Parameters     CV: Uneventful perioperative course   Sign Out status: CV Support within EXPECTED Parameters     Disposition:   Sign Out in:  ICU  Disposition:  ICU  Recovery Course: Recovery in ICU  Follow-Up: Not required     Comments/Narrative:  Transported to SICU with full monitors.  Report given to ICU MD.           Last Anesthesia Record Vitals:  CRNA VITALS  2019 0438 - 2019 0536      2019             ART BP:  130/78    ART Mean:  89    Ht Rate:  78          Last PACU Vitals:  Vitals Value Taken Time   /89 2019  5:26 AM   Temp     Pulse 74 2019  5:26 AM   Resp     SpO2 98 % 2019  5:35 AM   Temp src     NIBP     Pulse     SpO2     Resp     Temp     Ht Rate 78 2019  5:25 AM   Temp 2     Vitals shown include unvalidated device data.      Electronically Signed By: Jayla Euceda MD, 2019, 5:36 AM

## 2019-04-29 NOTE — OP NOTE
Procedure Date: 04/29/2019      LOCATION:  [facility] hybrid operating room.      PREOPERATIVE DIAGNOSIS:  Ruptured abdominal aortic aneurysm.      POSTOPERATIVE DIAGNOSIS:  Ruptured abdominal aortic aneurysm with type IB endoleak.      PROCEDURE:  Ultrasound-guided vascular access, aortogram and repair of ruptured aortic aneurysm with endograft cuff extension times one, and large bore access closure.      SURGEON:  Becca Power MD      FIRST ASSISTANT:  Pia Rojas MD      ANESTHESIA:  General anesthesia.      FINDINGS:  On ultrasound-guided access, healthy-appearing large caliber common femoral artery on the right side with a small amount of calcium on the anterior wall, but adequate for percutaneous access.  Access gained with a single stick at the 12 o'clock position.  On initial fluoroscopy wire went past the iliac graft limb and into the aneurysm sac outside of the endograft immediately proving that we had a poor distal seal.  First angiogram performed from within the endograft limb showed flow around the endograft limb and back up into the aneurysm sac, confirming a type IB endoleak.  Limb extension used was an Endurant II [id number] [id number] extension to the iliac bifurcation.  Post-ballooning completion angiogram revealed good positioning of the endograft limb.  Maintained patency of the internal and external iliac arteries and no further retrograde flow into the aneurysm sac with an excluded rupture.  Estimated 35 mL of contrast agent.  Fluoroscopy time was 8 minutes and a dose of 761 milligray was used.      BRIEF CLINICAL HISTORY:  This is an 78-year-old gentleman who has in August of this past undergone thoracoabdominal endovascular aneurysm repair using the manifold [employer] system in [place], had had a known type 2 endoleak and developed right hip and then abdominal pain over the last 3 days, presented with clinical signs of rupture and a CT scan eventually confirming rupture with what  appeared to be such a poor distal seal.  Plan for endovascular repair versus possible open repair.      PROCEDURE NOTE:  The patient was prepped and draped for access to his abdomen, and both groins.  Ultrasound was performed with the findings noted above.  Under ultrasound guidance, needle access was gained to the right common femoral artery and images sent to the PACS system.  Seldinger technique was then used to exchange in a 7-Qatari sheath and then through this a marker catheter and a Data Driven Delivery Systemson wire were advanced up into the iliac.  The wire immediately passed by the endograft limb and into the aneurysm sac, confirming that we had a distal type 1A seal problem.  We reentered the lumen of the graft and performed our first angiogram with the findings noted above.  We then recognized that we would be converted into a large bore access closure.  At this point, deployed 2 ProGlides in a preclose fashion and then exchanged in an 11-Qatari sheath.  Once again we cannulated the lumen of the graft limb without difficulty and then exchange in a Lunderquist wire.  Over the Lunderquist wire we performed yet another angiogram to confirm our iliac bifurcation and then over the Lunderquist guidewire, we exchanged in the endograft limb as noted above.  We deployed this with approximately 2 stents extending beyond the preexisting stent and then ballooned it with a Reliant balloon.  A completion angiogram was performed, first with a stiff sheath in, which appeared to obstruct outflow into the internal iliac and then with a stiff sheath pulled back which showed both the external and internal widely patent and good positioning of the endograft with no retrograde flow into the aneurysm sac.  At this point, we felt comfortable that the patient had had successful closure of his rupture.  He appeared to be hemodynamically stable.  The sheath was removed and the ProGlide devices were deployed successfully.  Completion duplex was  performed showing no active extravasation, pseudoaneurysm formation or thrombus in the lumen of the vessel and good positioning of the ProGlide sutures on the anterior wall of the artery.         ARNAUD FERNANDO MD             D: 2019   T: 2019   MT: YADI      Name:     YOLETTE ROBLES   MRN:      [id number]        Account:        [id number]   :      1940           Procedure Date: 2019      Document: [id number]       cc: ISAAC CONTI MD

## 2019-04-29 NOTE — ANESTHESIA PROCEDURE NOTES
Central Line Procedure Note  Staff:     Anesthesiologist:  Jayla Euceda MD    Resident/CRNA:  Robin Alex MD    Central line placed by Resident/CRNA in the presence of a teaching physician    Location: In OR after induction  Procedure Start/Stop Times:     patient identified, IV checked, site marked, risks and benefits discussed, informed consent, monitors and equipment checked, pre-op evaluation and at physician/surgeon's request      Correct Patient: Yes      Correct Position: Yes      Correct Site: Yes      Correct Procedure: Yes      Correct Laterality:  Yes    Site Marked:  Yes  Line Placement:     Procedure:  Central Line    Insertion laterality:  Right    Insertion site:  Internal Jugular    Position:  Trendelenburg      Maximal Sterile Barriers: All elements of maximal sterile barrier technique followed      (Maximal sterile barriers include:   Sterile gown, Sterile Gloves, Mask, Cap, Whole body draped, hand hygiene and acceptable skin prep).Skin Prep: Chloraprep         Injection Technique:  Ultrasound guided    Sterile Ultrasound Technique:  Sterile probe cover and Sterile gel    Vein evaluated via U/S for patency/adequacy of catheter insertion and is adequate.  Using realtime U/S imaging the vein was punctured, and needle was observed entering vein on U/S      Local skin infiltration:  None    Catheter size:  9 Fr, 2 lumen 11.5 cm (MAC)    Catheter length at skin (cm):  15    Cath secured with: suture      Dressing:  Tegaderm and Biopatch    Complications:  None obvious    Blood aspirated all lumens: Yes      All Lumens Flushed: Yes      Verification method:  Placement to be verified post-op

## 2019-04-29 NOTE — ED NOTES
Bed: ED02  Expected date:   Expected time:   Means of arrival:   Comments:  AAA  TX FROM [facility]  Leak from previous aortic repair   Normotensive

## 2019-04-29 NOTE — ED TRIAGE NOTES
BIBA transfer from [facility] with RLQ/R rib cage/R flank pain radiating to right leg with CT concerning for AAA with leak. VSS.

## 2019-04-29 NOTE — PHARMACY-ADMISSION MEDICATION HISTORY
Admission medication history interview status for the 4/28/2019 admission is complete. See Epic admission navigator for allergy information, pharmacy, prior to admission medications and immunization status.     Medication history interview sources:  Patient, [facility] medication list    Changes made to PTA medication list (reason)  Added: All  Deleted: None  Changed: None    Additional medication history information (including reliability of information, actions taken by pharmacist): I updated the medication list based primarily on the list sent from [facility] and then verified with the patient. He was able to tell me he does not take scheduled acetaminophen and recalled simvastatin for cholesterol, but could otherwise only describe his medications. He told me he takes a small pink tablet along with the white tablet (which I am confident is simvastatin). The pink tablet is most likely ranitidine as that was listed from [facility] and is generally a pink tablet while atenolol is most often white. He also told me he uses an orange inhaler (Combivent) and not a purple one (Advair) and has a red one (Albuterol) that he has for emergencies. Although he was not able to directly name his medications I am reasonably confident due to the medication list from [facility] that this list is accurate. He was confident in when he had his last doses.      Prior to Admission medications    Medication Sig Last Dose Taking? Auth Provider   acetaminophen (TYLENOL) 325 MG tablet Take 650 mg by mouth every 4 hours as needed for mild pain Past Month Yes Unknown, Entered By History   albuterol (PROAIR HFA/PROVENTIL HFA/VENTOLIN HFA) 108 (90 Base) MCG/ACT inhaler Inhale 2 puffs into the lungs every 4 hours as needed Past Month Yes Unknown, Entered By History   Ipratropium-Albuterol (COMBIVENT RESPIMAT)  MCG/ACT inhaler Inhale 2 puffs into the lungs 2 times daily 4/28/2019 at am Yes Unknown, Entered By History   ranitidine  (ZANTAC) 300 MG tablet Take 300 mg by mouth At Bedtime 4/27/2019 at hs Yes Unknown, Entered By History   simvastatin (ZOCOR) 10 MG tablet Take 10 mg by mouth At Bedtime 4/27/2019 at hs Yes Unknown, Entered By History         Medication history completed by: AMANUEL Lala Pharmacy Student  ,

## 2019-04-30 NOTE — PROGRESS NOTES
04/30/19 1428   Quick Adds   Type of Visit Initial Occupational Therapy Evaluation   Living Environment   Lives With spouse   Living Arrangements house   Home Accessibility stairs to enter home   Number of Stairs, Main Entrance 5   Transportation Anticipated family or friend will provide   Living Environment Comment Pt's wife is home to A prn   Self-Care   Usual Activity Tolerance good   Current Activity Tolerance fair   Regular Exercise No   Equipment Currently Used at Home walker, rolling;wheelchair, manual;commode   Activity/Exercise/Self-Care Comment has used walker since original AAA surgery   Functional Level   Ambulation 1-->assistive equipment  (walker)   Transferring 1-->assistive equipment   Toileting 1-->assistive equipment   Bathing 0-->independent   Dressing 0-->independent   Eating 0-->independent   Communication 0-->understands/communicates without difficulty   Swallowing 0-->swallows foods/liquids without difficulty   Cognition 0 - no cognition issues reported   Fall history within last six months no   Which of the above functional risks had a recent onset or change? 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   Precautions/Limitations fall precautions   Cognitive Status Examination   Orientation orientation to person, place and time   Level of Consciousness alert   Visual Perception   Visual Perception No deficits were identified   Sensory Examination   Sensory Comments Pt denies deficits   Pain Assessment   Patient Currently in Pain Yes, see Vital Sign flowsheet   Integumentary/Edema   Integumentary/Edema no deficits were identifed   Range of Motion (ROM)   ROM Comment  WNL   Strength   Strength Comments WNL   Muscle Tone Assessment   Muscle Tone Comments WNL   Coordination   Upper Extremity Coordination No deficits were identified   Transfer Skill: Bed to Chair/Chair to Bed   Level of [place]: Bed to Chair maximum assist (25% patients effort)   Transfer Skill: Sit to Stand   Level of [place]: Sit/Stand minimum assist (75% patients effort)   Upper Body Dressing   Level of [place]: Dress Upper Body minimum assist (75% patients effort)   Lower Body Dressing   Level of [place]: Dress Lower Body maximum assist (25% patients effort)   Toileting   Level of [place]: Toilet maximum assist (25% patients effort)   Grooming   Level of [place]: Grooming stand-by assist   Eating/Self Feeding   Level of [place]: Eating independent   Instrumental Activities of Daily Living (IADL)   Previous Responsibilities   (wife does most IADLs)   Activities of Daily Living Analysis   Impairments Contributing to Impaired Activities of Daily Living pain;strength decreased   General Therapy Interventions   Planned Therapy Interventions ADL retraining;progressive activity/exercise;home program guidelines;transfer training   Clinical Impression   Criteria for Skilled Therapeutic Interventions Met yes, treatment indicated   OT Diagnosis decreased ADL I and tolerance   Influenced by the following impairments weakness, pain   Assessment of Occupational Performance 3-5 Performance Deficits   Identified Performance Deficits home mgmt, leisure, dressing, bathing   Clinical Decision Making (Complexity) Low complexity   Therapy Frequency 5 times/wk   Predicted Duration of Therapy Intervention (days/wks) 5/7/19   Anticipated Equipment Needs at Discharge   (TBD)   Anticipated Discharge Disposition Transitional Care Facility   Risks and Benefits of Treatment have been explained. Yes   Patient, Family & other staff in agreement with plan of care Yes   Clinical Impression Comments Pt may  "benefit from skilled OT to help increase ADL I and tolerance post op   [facility] TM \"6 Clicks\"   2016, Trustees of [facility], under license to [employer].  All rights reserved.   6 Clicks Short Forms Daily Activity Inpatient Short Form   [facility]  \"6 Clicks\" Daily Activity Inpatient Short Form   1. Putting on and taking off regular lower body clothing? 2 - A Lot   2. Bathing (including washing, rinsing, drying)? 2 - A Lot   3. Toileting, which includes using toilet, bedpan or urinal? 2 - A Lot   4. Putting on and taking off regular upper body clothing? 4 - None   5. Taking care of personal grooming such as brushing teeth? 4 - None   6. Eating meals? 4 - None   Daily Activity Raw Score (Score out of 24.Lower scores equate to lower levels of function) 18   Total Evaluation Time   Total Evaluation Time (Minutes) 10     "

## 2019-04-30 NOTE — PLAN OF CARE
Neuro- A/Ox4, follows commands, no c/o pain. Afebrile.   CV- HR in the 80's no ectopy. SBP goal <120, no PRN's given and esmolol remains off.   Pulm- Sating 98% on 4L NC, titrating down O2. Lung sounds clear/diminished.   GI/- No BM, active BS. Betancourt removed @ 1600, due to void.   Skin- R groin site with no s/s of hematoma, good pedal pulses. CMS intact to lower extremities.     Plan- Follow POC and update team with changes/concerns.

## 2019-04-30 NOTE — PLAN OF CARE
Discharge Planner OT   Patient plan for discharge: home  Current status: Pt required Max A for bed mobility, Min A to stand and Max A to take steps to tx to a chair. Max A for LE dressing and SBA for simple g/h. VSS While on 1L O2 NC.   Barriers to return to prior living situation: mobility, weakness, falls, pain  Recommendations for discharge: TCU  Rationale for recommendations: Pt not safe for mobility and OOB ADLs at this time, may benefit from TCU to increase ADL I and tolerance.       Entered by: Carmelo Mosqueda 04/30/2019 3:00 PM

## 2019-04-30 NOTE — PROGRESS NOTES
Neuro: Pt alert and oriented x4 . BURTON, peripheral pulses dopplerable. Pain controlled with Tylenol. Up to chair x5 with moderate assist of two.   CV: NSR, no ectopy. Labetolol x1 to achieve SBP below 130. 120's and below the rest of the day.   Pulmonary: Lung sounds clear, sats adequately on 3 liters NC.   GI: Tolerates regular diet. Good appetite.No BM this shift. IV saline locked for adequate intake.   : Voiding 100 to 150cc at a time with greater frequency as the day went on.   Plan: Anticipate transfer to  as bed becomes available.

## 2019-04-30 NOTE — PLAN OF CARE
D=Pt awake alert and orientedx4. Heart rate in 80s no ectopy. SBP in 120s.Pt bladder scanned at MN was 131 at 02 was 137 and 04 was 187.MD was updated.MD ordered 250cc of Albumin5% at 0400.Bladder scanned at 06 was 300.Pt voided 150 ccof dark mirtha urine.Pt afebrile, complained of rt side of abdomen pain, medicated w/ Tylenol 650 mg po. Hot packs applied to rt side. Rt groin incision dermabonded, sl redness noted no hamatoma.Pedal pulses  dopplerable.Vascular fellow in this AM updated, NS was started at 50cc/hr.  P=Continue to closely monitor

## 2019-04-30 NOTE — PROGRESS NOTES
Vascular Surgery Progress Note    Low urine output overnight, given 250 mL albumin, has not required any antihypertensive agents  Pain controlled, somewhat weak.    B/P: 145/89, T: 97.6, P: 68, R: 20  Alert oriented no acute distress  Right groin access site c/d/i with skin glue  +dp/pt signals bilaterally, feet warm mild edema  Abd soft, obese, nontender.    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  Dispo planning to him in next few days. Plans for right iliac branch endoprosthesis from Dr. Cottrell in [place] in near future.  Will attempt to send him the imaging.    Xiomara Rios MD  Vascular Surgery Fellow  Pager [phone]

## 2019-04-30 NOTE — PROGRESS NOTES
SURGICAL ICU PROGRESS NOTE  April 30, 2019        Date of Service (when I saw the patient): 04/30/2019    ASSESSMENT:  Kai Carreon is a 78 year old male with Hx of HTN and COPD who is POD #1 s/p endovascular repair for type I endoleak from R iliac stent graft. Admitted to SICU postoperatively for hemodynamic monitoring. Pt with no oxygen or pressor requirement, plan to transition out of ICU today.     CHANGES and MAJOR THINGS TODAY:   - Increase Cr today, restarted IVF  - Floor Transfer today  - Advance diet as tolerated, low fat diet      PLAN:  Neuro/ pain/ sedation:  - Monitor neurological status; notify the MD for any acute changes in exam  - tylenol prn for pain.     Pulmonary care:  #Hx COPD   - Supplemental oxygen to keep saturation above 92%, continue to wean  - Incentive spirometer [id number] when awake  - Pulmonary toilet  - Duonebs Q6, Breo Ellipta QD     Cardiovascular:  # Type I endoleak from iliac stent graft    - Monitor hemodynamic status  - labetalol prn for elevated blood pressures  - Currently on PTA Atenolol 25mg QHS  - SBP<120  - CMS checks  - Vascular surgery planning for R iliac branch endoprosthesis in [place] in near future.      GI care:   - Advance diet as tolerated; low fat diet ordered     Fluids/ Electrolytes/ Nutrition:   - NS at 50cc/hr for IV fluid hydration, in setting of increased Cr  - No indication for parenteral nutrition    Renal/ Fluid Balance:    - Urine output is adequate so far, voiding spontaneously   - Will continue to monitor intake and output     Endocrine:    - No management indication      ID/ Antibiotics:  - No antibiotics warranted at this time     Heme:  #Coagulopathy     - Hemoglobin noted to be 7.0 today, is s/p transfusion 1 pRBC  - Continue to follow     MSK:  - Up ad vanessa   - PT/OT    Prophylaxis:    - Mechanical prophylaxis for DVT      Lines/ tubes/ drains:  - A-line and MAC line removed today. PIV     Disposition:  - Floor transfer today     Patient  seen, findings and plan discussed with surgical ICU staff Dr. Harpal Olivares M.D.   CA1 Anesthesiology       ====================================  INTERVAL HISTORY:   Pt doing well this AM, no complaints at this time Does endorse he is hungry and would like to eat. No other events reported per nursing staff.     OBJECTIVE:   1. VITAL SIGNS:   Temp:  [96.6  F (35.9  C)-98.7  F (37.1  C)] 97.8  F (36.6  C)  Heart Rate:  [65-97] 74  Resp:  [14-26] 19  MAP:  [59 mmHg-92 mmHg] 86 mmHg  Arterial Line BP: (101-144)/(41-67) 129/63  SpO2:  [91 %-100 %] 91 %  FiO2 (%): 9 %  Resp: 19      2. INTAKE/ OUTPUT:   I/O last 3 completed shifts:  In: 2479.3 [P.O.:710; I.V.:1519.3]  Out: 635 [Urine:535; Emesis/NG output:100]    3. PHYSICAL EXAMINATION:  General: NAD, resting comfortably in bed  Pulm/Resp: Clear breath sounds bilaterally without rhonchi, crackles or wheeze,breathing non-labored  CV: Regular rate and rhythm, no murmurs appreciated  Abdomen: Abdomen soft, non-distended, no rebound or guarding.  MSK/Extremities: warm and well perfused, peripheral pulses intact.   Skin: No rashes or lesions appreciated.    4. INVESTIGATIONS:   Arterial Blood Gases   Recent Labs   Lab 04/29/19  0354 04/29/19  0300   PH 7.35 7.35   PCO2 47* 45   PO2 166* 197*   HCO3 26 25     Complete Blood Count   Recent Labs   Lab 04/30/19  0321 04/29/19  1905 04/29/19  1535 04/29/19  0550 04/29/19  0354   WBC 9.6  --  7.8 8.1 5.5   HGB 7.0* 7.7* 7.8* 8.5* 7.0*  7.5*   PLT 79*  --  77* 85* 82*     Basic Metabolic Panel  Recent Labs   Lab 04/30/19  0321 04/29/19  1535 04/29/19  0550 04/29/19  0354  04/28/19  2349    138 136 139   < > 134   POTASSIUM 4.2 4.1 4.4 4.8   < > 4.7   CHLORIDE 104 104 104  --   --  106   CO2 26 25 24  --   --  26   BUN 34* 31* 30  --   --  35*   CR 1.46* 1.04 1.05  --   --  1.10   * 108* 144* 143*   < > 131*    < > = values in this interval not displayed.     Liver Function Tests  Recent Labs   Lab  04/30/19 0321 04/29/19 1905 04/29/19  1535 04/29/19  0550  04/28/19  2349   AST  --   --   --   --   --  35   ALT  --   --   --   --   --  33   ALKPHOS  --   --   --   --   --  96   BILITOTAL  --   --   --   --   --  0.4   ALBUMIN  --   --   --   --   --  3.1*   INR 1.35* 1.31* 1.33* 1.44*   < > 3.00*    < > = values in this interval not displayed.     Pancreatic Enzymes  Recent Labs   Lab 04/28/19 2349   LIPASE 80     Coagulation Profile  Recent Labs   Lab 04/30/19 0321 04/29/19 1905 04/29/19  1535 04/29/19  0550 04/29/19  0354 04/29/19  0300   INR 1.35* 1.31* 1.33* 1.44* 1.48* 1.96*   PTT  --  34  --  44* 41* 49*         5. RADIOLOGY:   No results found for this or any previous visit (from the past 24 hour(s)).    =========================================

## 2019-05-01 NOTE — PROVIDER NOTIFICATION
"0200- On call surgery notified ([id number])  of patients increased pain, restlessness.  Pt given prn tylenol with out relief to lower right abdomen/groin area.      .0200- ./70 (BP Location: Left arm)   Pulse 70   Temp 96.8  F (36  C) (Oral)   Resp 20   Ht 1.778 m (5' 10\")   Wt 88.8 kg (195 lb 12.3 oz)   SpO2 94%   BMI 28.09 kg/m        Spoke with on call surgery and atarax was ordered in addition to tylenol.  No other orders at this time.    Nursing will notify on call with any other changes to patient status.    "

## 2019-05-01 NOTE — PLAN OF CARE
"2200-0730    ../70 (BP Location: Left arm)   Pulse 70   Temp 96.8  F (36  C) (Oral)   Resp 20   Ht 1.778 m (5' 10\")   Wt 88.8 kg (195 lb 12.3 oz)   SpO2 97%   BMI 28.09 kg/m        ..Activity: up to chair with strong assist   Neuros: alert and orientated x 4, restless at times   Cardiac: HR 70-80's, BLE DP's/PT's found with doppler   Respiratory: dyspnea with exertion, O2 sats 91-97% on 3L NC  GI:  abdomen rounded/tender on right side.  Pt passing gas, but no BM since before surgery  : voiding spont (adequate amounts)   Diet: regular low sat fat, low sodium   Skin: intact except right groin puncture site   Lines: PIV   Incisions/Drains: right groin puncture site CDI/BHANU, no hematoma   Labs: pending   Pain:  c/o right groin/abdomen pain not relieved with tylenol, \"good\" pain control with oxycodone 5mg x 1  nausea: none   New changes this shift:  increased pain to right groin/abdomen, on call MD here to see  Plan: continue POC     "

## 2019-05-01 NOTE — PLAN OF CARE
Discharge Planner PT   Patient plan for discharge: unstated  Current status: PT 7B: Eval complete, treatment provided. Arrived to see pt this AM. Pt initially presented with reduced memory and alertness. Alertness and orientation improved as visit went on though pt still with some reduced STM. Pt required MODA-MAXA x 2 supine>sit. BENJAMIN to steady at edge of bed and further SBA sitting at edge of bed x 6 min. Pt c/o R thigh numbness, demonstrated significant R quad weakness (unable to initiate any R knee extension sitting). Pt required increase to 4L via NC due to desaturation to 82% on 2L NC with activity. Pt stood from slightly raised bed with MODA x 2, R knee block, walker slightly placed L and was able to stand x 2 reps up to 10 sec. Pt able to scoot sideways in sitting, but unable to step while standing. Pt required MODA-MAXA x 2 sit>supine.     Pt reports no prior R knee weakness or numbness like current presentation. Spoke with Vascular team regarding pt status. Deferred transfer to chair as pt yet lacking chair alarm and safe chair to use due to reduced alertness.     Barriers to return to prior living situation: medical status, significant weakness  Recommendations for discharge: TCU  Rationale for recommendations: due to unsafe discharge plan due to far below baseline functional status, pt has rehab needs to maximize functional recovery       Entered by: Gena Arthur 05/01/2019 11:17 AM

## 2019-05-01 NOTE — PROGRESS NOTES
05/01/19 0852   Quick Adds   Type of Visit Initial PT Evaluation   Living Environment   Lives With spouse   Living Arrangements house   Home Accessibility stairs to enter home   Number of Stairs, Main Entrance 5   Transportation Anticipated family or friend will provide   Living Environment Comment Pt lives with spouse, who per OT notes is available to assist as needed   Self-Care   Usual Activity Tolerance good   Current Activity Tolerance fair   Regular Exercise No   Equipment Currently Used at Home walker, rolling;wheelchair, manual;commode   Activity/Exercise/Self-Care Comment has used walker since original AAA surgery   Functional Level Prior   Ambulation 1-->assistive equipment   Transferring 1-->assistive equipment   Toileting 1-->assistive equipment   Bathing 0-->independent   Communication 0-->understands/communicates without difficulty   Swallowing 0-->swallows foods/liquids without difficulty   Cognition 0 - no cognition issues reported   Fall history within last six months no   Which of the above functional risks had a recent onset or change? transferring;ambulation   Prior Functional Level Comment TODD with walker for gait. Pt reports no prior R knee weakness or numbness like current presentation   General Information   Onset of Illness/Injury or Date of Surgery - Date 04/28/19   Referring Physician Lejeune, Stacey, MD   Patient/Family Goals Statement Pt unsure.    Pertinent History of Current Problem (include personal factors and/or comorbidities that impact the POC)  Kai Carreon is a 78 year old male with Hx of HTN and COPD who is POD #1 s/p endovascular repair for type I endoleak from R iliac stent graft. Admitted to SICU postoperatively for hemodynamic monitoring.    Precautions/Limitations fall precautions;oxygen therapy device and L/min   General Info Comments activity: up ad vanessa when awake and alert   Cognitive Status Examination   Level of Consciousness alert;lethargic/somnolent   Follows  Commands and Answers Questions 100% of the time  (slower response time noted)   Cognitive Comment Pt initially presented with reduced memory and alertness. Alertness and orientation improved as visit went on though pt still with some reduced STM.   Pain Assessment   Patient Currently in Pain Yes, see Vital Sign flowsheet   Posture    Posture Comments reduced upright posture noted   Range of Motion (ROM)   ROM Comment unable to actively extend R knee sitting, nor bend actively   Strength   Strength Comments impaired R LE strength, no < 3-/5 hip flex, knee ext.    Bed Mobility   Bed Mobility Comments Pt required MODA-MAXA x 2 supine>sit.  Pt c/o R thigh numbness, demonstrated significant R quad weakness (unable to initiate any R knee extension sitting)    Transfer Skills   Transfer Comments Pt stood from slightly raised bed with MODA x 2, R knee block, walker slightly placed L and was able to stand x 2 reps up to 10 sec   Gait   Gait Comments pt unable to perform due to deficits   Balance   Balance Comments BENJAMIN to steady at edge of bed and further SBA sitting at edge of bed, has impaired standing balance: see above   Sensory Examination   Sensory Perception Comments c/o R thigh numbness, noted reduced R thigh sensation anteriorly with soft touch check   General Therapy Interventions   Planned Therapy Interventions bed mobility training;gait training;ROM;strengthening;stretching;transfer training;home program guidelines;progressive activity/exercise;neuromuscular re-education   Clinical Impression   Criteria for Skilled Therapeutic Intervention yes, treatment indicated   PT Diagnosis impaired mobility   Influenced by the following impairments weakness, reduced ROM especially R LE, strength in R LE, balance, orientation, safety   Functional limitations due to impairments below baseline bed mobility, transfers, gait   Clinical Presentation Evolving/Changing   Clinical Presentation Rationale clinical judgement   Clinical  "Decision Making (Complexity) Moderate complexity   Therapy Frequency` other (see comments)  (6x/wk)   Predicted Duration of Therapy Intervention (days/wks) 1 week   Anticipated Discharge Disposition Transitional Care Facility   Risk & Benefits of therapy have been explained Yes   Patient, Family & other staff in agreement with plan of care Yes   [facility] TM \"6 Clicks\"   2016, Trustees of [facility], under license to [employer].  All rights reserved.   6 Clicks Short Forms Basic Mobility Inpatient Short Form   [facility]  \"6 Clicks\" V.2 Basic Mobility Inpatient Short Form   1. Turning from your back to your side while in a flat bed without using bedrails? 2 - A Lot   2. Moving from lying on your back to sitting on the side of a flat bed without using bedrails? 2 - A Lot   3. Moving to and from a bed to a chair (including a wheelchair)? 2 - A Lot   4. Standing up from a chair using your arms (e.g., wheelchair, or bedside chair)? 2 - A Lot   5. To walk in hospital room? 1 - Total   6. Climbing 3-5 steps with a railing? 1 - Total   Basic Mobility Raw Score (Score out of 24.Lower scores equate to lower levels of function) 10   Total Evaluation Time   Total Evaluation Time (Minutes) 9     "

## 2019-05-01 NOTE — PROVIDER NOTIFICATION
Vascular resident paged regarding increased tingling and weakness in R leg, pulses present with doppler. Team ordered CXR and R leg US, will continue with poc.

## 2019-05-01 NOTE — PLAN OF CARE
A&O x4, VSS on 2L per NC. PRN Hydralazine given this am for /87 per orders. R abd/groin pain controlled with PRN Oxy x1. Provider notified this am of increased tingling and weakness in R leg while getting up with PT, team came to assess pt, CXR and US of R leg ordered and completed this shift. R groin puncture site with small amt dried drainage, no hematoma present. R PIV SL. Up with 2 assist, gait belt, and walker to chair and back to bed x2. Tolerating low sat fat/low sodium diet. Using urinal, voiding adequately, no BM this shift, +bs and flatus. Continue with poc.

## 2019-05-01 NOTE — PROGRESS NOTES
VASCULAR SURGERY PROGRESS NOTE    SUBJECTIVE:  Patient laying in bed, complains of mild right groin and abdominal pain.       OBJECTIVE:  Vital signs:  Temp: 98.1  F (36.7  C) Temp src: Oral BP: 150/87 Pulse: 70 Heart Rate: 78 Resp: 20 SpO2: 97 % O2 Device: Nasal cannula Oxygen Delivery: 3 LPM      Intake/Output Summary (Last 24 hours) at 5/1/2019 0757  Last data filed at 5/1/2019 0300  Gross per 24 hour   Intake 2010 ml   Output 900 ml   Net 1110 ml       Vitals:    04/28/19 2345 04/29/19 0530 04/30/19 0400   Weight: 92.6 kg (204 lb 2.3 oz) 96.6 kg (212 lb 15.4 oz) 88.8 kg (195 lb 12.3 oz)       PHYSICAL EXAM:  NEURO/PSYCH: The patient is alert and oriented.  Appropriate.  Moves all extremities.    SKIN:  Warm and dry.  PULMONARY: non-labored breathing   EXTREMITIES: right groin site C/D/I with dermabond, mild bilateral lower extremity edema      BMP  Recent Labs   Lab 05/01/19  0620 04/30/19  1702 04/30/19  0321 04/29/19  1535    138 136 138   POTASSIUM 4.0 4.1 4.2 4.1   CHLORIDE 106 103 104 104   CO2 25 27 26 25   BUN 34* 36* 34* 31*   CR 1.19 1.43* 1.46* 1.04   GLC 96 97 107* 108*   MAG 2.4*  --  2.0  --    PHOS  --   --  5.2*  --      CBC  Recent Labs   Lab 05/01/19  0620 04/30/19  1702 04/30/19  0321 04/29/19  1905 04/29/19  1535   WBC 9.1 10.1 9.6  --  7.8   HGB 7.9* 7.6* 7.0* 7.7* 7.8*   * 89* 79*  --  77*     INR/PTT  Recent Labs   Lab 05/01/19  0620 04/30/19  0321 04/29/19  1905 04/29/19  1535 04/29/19  0550 04/29/19  0354 04/29/19  0300   INR 1.33* 1.35* 1.31* 1.33* 1.44* 1.48* 1.96*   PTT  --   --  34  --  44* 41* 49*     ABG  Recent Labs   Lab 04/29/19  0354 04/29/19  0300   PH 7.35 7.35   PCO2 47* 45   PO2 166* 197*   HCO3 26 25     LFT  Recent Labs   Lab 04/28/19  2349   AST 35   ALT 33   ALKPHOS 96   BILITOTAL 0.4   ALBUMIN 3.1*     Pancreas  Recent Labs   Lab 04/28/19  2349   LIPASE 80         ASSESSMENT:  78 year old male POD #2 status post right iliac extension placement for ruptured  AAA/iliac aneurysm         PLAN:  - wean oxygen, encourage IS   - continue therapies  - care coordinator following for discharge planning  - probable discharge in next 1-2 days  - plans for right iliac branch endoprosthesis from Dr. Cottrell in [place] in near future.  Will send CTA and OR imaging to him.          Karly GUERRA, CNS  Division of Vascular Surgery  [facility]    Pager [phone]  Office [phone]

## 2019-05-01 NOTE — PROGRESS NOTES
"Social Work: Assessment with Discharge Plan    Patient Name:  Kai Carreon  :  1940  Age:  78 year old  MRN:  [id number]  Risk/Complexity Score:     Completed assessment with:  Chart review, pt    Presenting Information   Reason for Referral:  Discharge plan, TCU placement  Date of Intake:  May 1, 2019  Referral Source:  Chart Review  Decision Maker:  Pt at baseline  Alternate Decision Maker:  Per [employer] policy- pt's wife Chantal  Health Care Directive: Not on file and pt was not overly participatory in visit so this was not discussed.   Living Situation:  House with spouse, there are KIERA.   Previous Functional Status: Per OT eval pt was using AE for the following: walking, transferring, toiletting. Also per eval pt's wife did most of the IADLs.   Patient and family understanding of hospitalization:  Unable to assess given pt's limited participation and pt presented as quite short of breath.   Cultural/Language/Spiritual Considerations:  Pt is an english speaking male whom is a .   Adjustment to Illness:  Difficult to assess given somewhat limited participation from pt. Pt presented as short of breath, on oxygen which he reports he is not on at home, had somewhat slurred speech, and responded with \"I don't know\" to the majority of questions.     Physical Health  Reason for Admission:    1. Ruptured abdominal aortic aneurysm (AAA) (H)    2. Endoleak of aortic graft (H)    3. Coagulopathy (H)      Services Needed/Recommended:  TCU    Mental Health/Chemical Dependency  Diagnosis:  None per H&P.   Support/Services in Place:  NA  Services Needed/Recommended:  NA    Support System  Significant relationship at present time:  Pt's wife whom pt lives with and is home to assist as needed.   Family of origin is available for support:  Unknown  Other support available:  Unknown  Gaps in support system:  Pt currently needs TCU and acknowledges that he is currently needing more assist than he does at baseline. " "  Patient is caregiver to:  Other:  Unknown     Provider Information   Primary Care Physician:  No Ref-Primary, Physician   None   Clinic:  No address on file      :  Unknown    Financial   Income Source:  Did not discuss  Financial Concerns:  None identified  Insurance:    Payor/Plan Subscriber Name Rel Member # Group #   MEDICARE - MEDICARE YOLETTE ROBLES  [id number]       ATTN CLAIMS, [address]   / - TRI* YOLETTE ROBLES  [id number]        FOR LIFE, [address]       Discharge Plan   Patient and family discharge goal:  Unclear but pt is agreeable to TCU referrals being made while he continues to work with PT/OT while hospitalized. Pt declined to have SW contact anyone to speak about discharge planning.   Provided education on discharge plan:  YES  Patient agreeable to discharge plan:  Potentially, pt was agreeable to TCU referrals being made and seeing where this process goes.   A list of Medicare Certified Facilities was provided to the patient and/or family to encourage patient choice. Patient's choices for facility are:  Yes. Pt did not have a preference of TCUs for referrals to be made to and after review of Medicare list options pt is agreeable to referrals to the following:  - [facility] (p. [phone], f. [phone])- left vm for admissions, referral sent via "[employer] waiting to hear back. Nicolasa later called back and left vm reporting pt is accepted for Thursday.   - [facility] (p. [phone], f. [phone])- left vm for Maryam in admissions, referral sent via "[employer] waiting to hear back.   Pt reported not wanting to go to [facility], [facility] or [facility] but replied \"I don't know\" when asked what area he would want to be in if he goes to TCU.   Will NH provide Skilled rehabilitation or complex medical:  YES  General information regarding anticipated insurance coverage and possible out of pocket cost was discussed. " "Patient and patient's family are aware patient may incur the cost of transportation to the facility, pending insurance payment: YES  Barriers to discharge:  Medical stability, bed availability/acceptance at TCU if pt is agreeable    Discharge Recommendations   Anticipated Disposition:  Facility:  TCU, location to be determined  Transportation Needs:  Other:  Pt reported his wife can transport him but it is unclear at this time if this would be a safe option  Name of Transportation Company and Phone:  [facility] if needed- [phone]    Additional comments   Pt is an 78 year old male admitted for repair of an endo leak from iliac stent graft. TCU is currently recommended and per Vascular team pt could be ready for discharge Thursday with just PT/OT needs. Per Vascular team they do not want to discharge pt if he is still needing oxygen, being that pt was not on oxygen PTA.     SW returned to pt's room to provide update on referrals. Pt was sitting in the chair and reported it felt good to do so. SW provided update on acceptance at [facility] and asked if pt would be agreeable to this discharge location if TCU is still recommended when he is ready for discharge. Pt replied \"nah\" and when SW asked if pt feels he is moving around well enough to safely be at home right now pt said yes. Pt reported it is just he and his wife at home and his wife is home all the time and can physically assist pt if needed. SW pointed out that per PT notes pt was needing assist of 2 and pointed out that pt just has his wife at home. Pt reported he also has a grandson whom lives 4 miles away from him and can help too. Pt was ok with SW keeping [facility] updated and seeing how pt does with PT/OT while still here.     Pt currently presents as lacking insight into his current mobility deficits.     KIMBERLYN Shipman, LICSW  7B   [phone] (pager) [phone]  5/1/2019        "

## 2019-05-02 NOTE — PROVIDER NOTIFICATION
Provider notified about PT concern with R sided weakness in hand, leg, and foot. Pulses present with doppler, A&Ox4, speech intact. Will continue with poc.

## 2019-05-02 NOTE — PROGRESS NOTES
Care Coordinator Progress Note    Admission Date/Time:  4/28/2019  Attending MD:  Becca Power MD    Data  Chart reviewed, discussed with interdisciplinary team.   Patient was admitted for:    Ruptured abdominal aortic aneurysm (AAA) (H)  Endoleak of aortic graft (H)  Coagulopathy (H).    Concerns with insurance coverage for discharge needs: None.  Current Living Situation: Patient lives with spouse.  Support System: Supportive and Involved  Services Involved: no services inovled prior to admission  Transportation at Discharge: Family or friend will provide  Transportation to Medical Appointments:   - Name of caregiver: spouseChantal  Barriers to Discharge: no barriers identified    Coordination of Care and Referrals: Provided patient/family with options for Home Care.        Assessment  Patient is an 78 year old male who is s/p right iliac extension placement for ruptured AAA/iliac aneurysm.  Per MD team patient is medically ready for discharge to home.  PT/OT recommending TCU, but patient refusing.  Met with patient and spouse, Chantal, at bedside to introduce RNCC role and discuss discharge planning.  Chantal understands his deficits and feels comfortable taking him home.  They are agreeable to home care for home therapy.  After giving choice of agency patient chose [facility](P: [phone], F: [phone]).  Referral made and orders placed.  Patients family will transport him to home.           Plan  Anticipated Discharge Date:  5/2/2019  Anticipated Discharge Plan:  Home with [facility] for PT/OT visits    LENORA Valderrama  [phone]

## 2019-05-02 NOTE — PLAN OF CARE
Physical Therapy Discharge Summary    Reason for therapy discharge:    Discharged to home with home therapy.    Progress towards therapy goal(s). See goals on Care Plan in [facility] electronic health record for goal details.  Goals not met.  Barriers to achieving goals:   discharge from facility and NOne of goals listed met .    Therapy recommendation(s):    Continued therapy is recommended.  Rationale/Recommendations:  TCU, Pt demonstrates significantly impaired functional mobility, RLE weakness, asymmetrical RUE/LE weakness compared to left, and increasing impairements with patient fatigue, poor safety awareness, poor ability to safety place hands on walker/chair with transfers. If pt continues to refuse rehab, recommend home with 24/7 assist, gait belt, walker at all times, and home PT/OT..

## 2019-05-02 NOTE — PROGRESS NOTES
"VASCULAR SURGERY PROGRESS NOTE    SUBJECTIVE:  Patient found laying in bed, reports adequate pain control.  Anxious to go home, refusing to go to TCU      OBJECTIVE:  Vital signs:  /73 (BP Location: Left arm)   Pulse 78   Temp 96.9  F (36.1  C) (Oral)   Resp 18   Ht 1.778 m (5' 10\")   Wt 88.8 kg (195 lb 12.3 oz)   SpO2 96%   BMI 28.09 kg/m        Intake/Output Summary (Last 24 hours) at 5/2/2019 0907  Last data filed at 5/2/2019 0112  Gross per 24 hour   Intake 170 ml   Output 600 ml   Net -430 ml           Vitals:    04/28/19 2345 04/29/19 0530 04/30/19 0400   Weight: 92.6 kg (204 lb 2.3 oz) 96.6 kg (212 lb 15.4 oz) 88.8 kg (195 lb 12.3 oz)       PHYSICAL EXAM:  NEURO/PSYCH: The patient is alert and oriented.  Appropriate.  Moves all extremities.    SKIN:  Warm and dry.  PULMONARY: non-labored breathing   EXTREMITIES: right groin site C/D/I with dermabond, mild bilateral lower extremity edema      BMP  Recent Labs   Lab 05/02/19  0636 05/01/19  1235 05/01/19  0620 04/30/19  1702 04/30/19  0321     --  140 138 136   POTASSIUM 3.8 3.9 4.0 4.1 4.2   CHLORIDE 106  --  106 103 104   CO2 23  --  25 27 26   BUN 30  --  34* 36* 34*   CR 1.02  --  1.19 1.43* 1.46*   *  --  96 97 107*   MAG 2.2  --  2.4*  --  2.0   PHOS  --   --   --   --  5.2*     CBC  Recent Labs   Lab 05/02/19  0636 05/01/19  0620 04/30/19  1702 04/30/19  0321   WBC 8.9 9.1 10.1 9.6   HGB 8.4* 7.9* 7.6* 7.0*    114* 89* 79*     INR/PTT  Recent Labs   Lab 05/01/19  0620 04/30/19  0321 04/29/19  1905 04/29/19  1535 04/29/19  0550 04/29/19  0354 04/29/19  0300   INR 1.33* 1.35* 1.31* 1.33* 1.44* 1.48* 1.96*   PTT  --   --  34  --  44* 41* 49*     ABG  Recent Labs   Lab 04/29/19  0354 04/29/19  0300   PH 7.35 7.35   PCO2 47* 45   PO2 166* 197*   HCO3 26 25     LFT  Recent Labs   Lab 04/28/19  2349   AST 35   ALT 33   ALKPHOS 96   BILITOTAL 0.4   ALBUMIN 3.1*     Pancreas  Recent Labs   Lab 04/28/19  2349   LIPASE 80 "         ASSESSMENT:  78 year old male POD #3 status post right iliac extension placement for ruptured AAA/iliac aneurysm         PLAN:  - encourage IS, OOB  - start ASA 81 mg daily  - continue therapies  -  following for discharge planning, patient refusing TCU placement, plans for discharge to home today with home care services    - plans for right iliac branch endoprosthesis from Dr. Cottrell in [place] in near future.  Will send CTA and OR imaging to him.          Karly GUERRA, CNS  Division of Vascular Surgery  [facility]    Pager [phone]  Office [phone]

## 2019-05-02 NOTE — PLAN OF CARE
"/54 (BP Location: Left arm)   Pulse 83   Temp 96  F (35.6  C) (Oral)   Resp 20   Ht 1.778 m (5' 10\")   Wt 88.8 kg (195 lb 12.3 oz)   SpO2 92%   BMI 28.09 kg/m      Neuro: A&Ox4, LE pulses need doppler, UE pulses palpable, denies N/T  Cardiac: SBP was 136 at start of shift, recheck was 140/65, PRN hydralazine given at start of shift, other VSS  Respiratory: Pt weaned to RA, O2 in the low to mid 90's, denies SOB, has dyspnea w/ activity, LS coarse w/ ex wheezes, encourage IS and pulm toliet   GI/: +BS, +flatus, no BM, voiding using urinal, will do bladder scan after next void to see if pt is emptying bladder completely due to frequency   Skin: R groin puncture site WDL, scant dried drainage, generalized bruising on lower abd  Pain: Denies at this time  LDA: PIV SL  Activity: Astx1-2 w/ GB and walker, chair alarm when OOB, R side more weak than L  Diet/Appetite: Low fat/sodium diet, tolerating well, denies nausea,   Plan: Ultrasound of RLE negative for DVT, try a PVR after next void, working on possible TCU placement, will continue w/ POC  "

## 2019-05-02 NOTE — PROGRESS NOTES
"Social Work Services Progress Note    Hospital Day: 5  Date of Initial Social Work Evaluation:  5/1/19- please see for details  Collaborated with:  Chart review, team rounds, Vascular team, pt, pt's wife Chantal ([phone]), [facility]    Data:  Pt is an 78 year old male admitted for repair of an endo leak from iliac stent graft. TCU is currently recommended and per Vascular team pt could be ready for discharge Thursday with just PT/OT needs.     Received update from Vascular team that pt is medically ready for discharge today.     Intervention:  SW met with pt in pt's room along with Vascular team. Vascular team was explaining to pt that he would do much better at a TCU and pt was lying in bed and shaking his head no and reported he doesn't agree that he needs TCU. SW asked if SW could contact his wife and pt gave SW permission to do so.     SW contacted pt's wife Chantal by phone to discuss discharge planning. Chantal demonstrated a good understanding of the reason for pt's hospitalization and discussed how pt has had a long medical course over the last while.   SW explained TCU recommendations and Chantal stated \"he's gonna fight it, I'll tell you that much\". SW explained current amount of assist needed and that pt reported he doesn't feel he needs TCU. Chantal reported pt \"was doing farely well at home\" but would be in bed for many hours at a time. Chantal discussed how she was having to help pt more at home since his leg started hurting and was helping him up the stairs in their split level home, where there are 6 steps going up and the only bathroom is upstairs. Chantal noted that pt eventually started using a commode to limit the stair use.   Chantal discussed how pt was at a TCU in [place] after a previous surgery but was calling her demanding that she come get him. Chantal said that pt was telling staff at that TCU that if they didn't help him call home he would call the police and have them take " "him to a hotel. Chantal discussed how she hasn't been here to see him because \"he gets me to do what the nurses won't do\" like trying to get water that he can't have and described pt as \"ornery\". Chantal reported that it's a problem for pt to not be in control.    Chantal reported she can physically assist with transfers and the stairs at home and reported that she has a grandson who is not currently working, lives 4 miles away, and said that either he or his wife can come over and help if needed. Chantal reported she doesn't expect pt will agree to going to TCU and is ok with pt discharging to home with home therapies. Chantal reported she can be here at approximately 1pm and SW explained that PT/OT staff would likely want to meet with her then.     SW informed RNCC Princess of pt's refusal of TCU as well as conversation had with pt's wife.     SW updated Vascular team as to conversation with pt's wife and they plan to contact her as well.     Assessment:  See bedside RN, PT/OT, medical team notes    Plan:    Anticipated Disposition:  Home with services    Barriers to d/c plan:  NA    Follow Up:  TBD, see discharge orders. SW will sign off at this time as pt is discharging to home, which RNCC is assisting with.    KIMBERLYN Shipman, [address]   [phone] (pager) [phone]  5/2/2019          "

## 2019-05-02 NOTE — PLAN OF CARE
A&O x4, VSS on RA. Denied pain and nausea. LE pulses present with doppler, UE palpable. R groin site cdi, some bruising present on RLQ of abdomen. Up with 1 assist, walker, and gait belt to chair and in holland, some weakness present on R side, team aware. TCU was recommended per team, PT/OT but patient refusing. Cleared to discharge to home with home services, discharge instructions given to pt, wife, and daughter. Pt left unit at 1415.

## 2019-05-02 NOTE — PROGRESS NOTES
"Vascular Surgery Brief Note    Called by PT regarding concerns for potential change in neurological status compared to patient's baseline    Patient found sitting up in chair    /66 (BP Location: Left arm)   Pulse 76   Temp 95.6  F (35.3  C) (Axillary)   Resp 18   Ht 1.778 m (5' 10\")   Wt 88.8 kg (195 lb 12.3 oz)   SpO2 97%   BMI 28.09 kg/m      Neuro: Zoila on command, right leg weaker than left, upper extremities strength equal, no visual disturbances, no facial droop, speech clear, cranial nerves intact    Spoke with patient and his wife who stated his right leg weakness was present 3 days prior to coming into hospital.    Patient continues to refuse TCU placement    Discharge to home with home care services    Karly GUERRA, CNS  Division of Vascular Surgery  [facility]    Pager [phone]  Office [phone]    "

## 2019-05-02 NOTE — PLAN OF CARE
"7B  Discharge Planner PT   Patient plan for discharge: home, open to home care if \"they want to come\"  Current status: Pt reporting R weakness has increased since surgery. Demonstrating drift in RUE when holding statically in front of body, Decreased strength and ability to fully raise RUE compared to left arm with shoulder flexion. Pt with no against gravity Dorsiflexion or hip flexion, unable to perform seated marching, requires manual assist to process and perform toe tapping on RLE, Unable to perform simultaneous toe tapping. Demonstrates poor ability to clear RUE with stairs, frequently catching on stairs with ascending/descending and unaware of placement without cues. Decreased ability to clear RLE with ambulation, foot catching and cross over stepping with midline on right only, noted difficulty with hand placement on R only with transition from walker/chair. Concerns regarding potential change in neurological status compared to baseline, RN and charge nurse notified, DNP for Vascular Karly huynh and concerns discussed.   Barriers to return to prior living situation: falls risk, impaired functional mobility, decreased strength  Recommendations for discharge: TCU  Rationale for recommendations: Pt demonstrates significantly impaired functional mobility, RLE weakness and impairments increased with patient fatigue, poor safety awareness, poor ability to safety place hands on walker/chair with transfers. If pt continues to refuse rehab, recommend home with 24/7 assist, gait belt, walker at all times, and home PT/OT.        Entered by: Marge Cottrell 05/02/2019 12:42 PM       "

## 2019-05-02 NOTE — DISCHARGE SUMMARY
VASCULAR SURGERY HOSPITAL DISCHARGE SUMMARY    ADMISSION DATE:  4/28/2019  ADMISSION DIAGNOSES: AAA (abdominal aortic aneurysm, ruptured) (H) [I71.3]    DISCHARGE DATE:  5/2/2019  DISCHARGE DIAGNOSES:   Problem List Items Addressed This Visit        Circulatory    Ruptured abdominal aortic aneurysm (AAA) (H)    Relevant Medications    aspirin (ASA) 81 MG chewable tablet    Other Relevant Orders    Blood component (Completed)      Other Visit Diagnoses     Endoleak of aortic graft (H)        Relevant Medications    aspirin (ASA) 81 MG chewable tablet    Coagulopathy (H)              CONDITION AT DISCHARGE: Stable  Discharge Disposition   Discharged to home with home care     DISCHARGING PROVIDER: MARI Yusuf, SHELLY  DATE OF SERVICE: 05/02/19    VASCULAR SURGEON: Dr. Power   Procedure(s):  Endovascular repair aneurysm abdominal aorta, ANNGIOGRAM, INSERT  STENT GRAFT ENDURANT II CONTRALATERAL LIMB [id number], CLOSURE OF LARGE BORE ACCESS RIGHT GROIN    Consultations This Hospital Stay   MEDICATION HISTORY IP PHARMACY CONSULT  PHYSICAL THERAPY ADULT IP CONSULT  OCCUPATIONAL THERAPY ADULT IP CONSULT  VASCULAR ACCESS CARE ADULT IP CONSULT  PHYSICAL THERAPY ADULT IP CONSULT  OCCUPATIONAL THERAPY ADULT IP CONSULT    HPI:  Kai Carreon is an 78 year old male with past medical history remarkable for COPD, HTN, and AAA who presented to the ED on 4/28/2019 as a transfer from [facility] due to concern for impending rupture of his aortic aneurysm.  He underwent a complex graft repair of his aortic aneurysm on 8/21/2018 with Dr. Cottrell at [facility] in [place] and had a known type II endoleak.  He developed severe right hip pain and then abdominal pain 3 days prior to him coming to the hospital.  A CT scan was done confirming rupture and probable poor distal seal.  On 4/29/2019, he underwent right iliac extension placement for ruptured AAA/iliac aneurysm with Dr. Power.  His hospital course was uncomplicated.  He  had return of his normal bowel and bladder functions.  He tolerated a regular diet and his pain was controlled with Tylenol. He participated with physical and occupational therapy and he was found to have decreased mobility.  It was recommended that he be discharged to a TCU for further rehab, but he refused.  He was discharged to home with home care services in stable condition.  He will follow up with Dr. Anibal Cottrell in 2-3 weeks.  Dr. Cottrell's office was notified of patient's recent surgery and hospitalization and patient's images were sent to them.  Patient and his wife were provided vascular surgery contact information should any questions or concerns arise at home.             PHYSICAL EXAM:  NEURO/PSYCH: The patient is alert and oriented.  Appropriate.  Moves all extremities.    SKIN:  Warm and dry.  PULMONARY: non-labored breathing   EXTREMITIES: right groin site C/D/I with dermabond, mild bilateral lower extremity edema          PRIMARY CARE PROVIDER:  Primary Care Physician   Physician No Ref-Primary      Code Status   Full Code      Discharge Orders      Reason for your hospital stay    You underwent right iliac extension placement for ruptured AAA/iliac aneurysm on 4/29/2019 with Dr. Power     Follow Up and recommended labs and tests    Follow up with Dr. Anibal Cottrell in 2-3 weeks [phone]     Activity    Your activity upon discharge: no heavy lifting, pushing or pulling over 10 lbs for 2 weeks.  No strenuous activity.    Okay to shower, no tub baths     When to contact your care team    Contact Dr. Power's vascular surgery team if any increased pain, swelling, or fever develop.    With questions, concerns, or to request an appointment, please call either:    Virginie Garcia, Care Coordinator RN, Vascular Surgery  [phone]    Vascular Call Center  [phone]    To contact someone after 5 pm, on a weekend, or on a Holiday, please call:  [facility]  [phone],  option 4 to have a member of the Vascular Surgery Service paged.     Full Code     Diet    Follow this diet upon discharge: adult regular, low salt     Discharge Medications   Current Discharge Medication List      START taking these medications    Details   aspirin (ASA) 81 MG chewable tablet Take 1 tablet (81 mg) by mouth daily  Qty: 100 tablet, Refills: 0    Associated Diagnoses: Ruptured abdominal aortic aneurysm (AAA) (H); Endoleak of aortic graft (H)      atenolol (TENORMIN) 25 MG tablet Take 1 tablet (25 mg) by mouth At Bedtime  Qty: 30 tablet, Refills: 0      fluticasone-vilanterol (BREO ELLIPTA) 200-25 MCG/INH inhaler Inhale 1 puff into the lungs daily  Qty: 1 Inhaler, Refills: 0         CONTINUE these medications which have NOT CHANGED    Details   acetaminophen (TYLENOL) 325 MG tablet Take 650 mg by mouth every 4 hours as needed for mild pain      albuterol (PROAIR HFA/PROVENTIL HFA/VENTOLIN HFA) 108 (90 Base) MCG/ACT inhaler Inhale 2 puffs into the lungs every 4 hours as needed      Ipratropium-Albuterol (COMBIVENT RESPIMAT)  MCG/ACT inhaler Inhale 2 puffs into the lungs 2 times daily      ranitidine (ZANTAC) 300 MG tablet Take 300 mg by mouth At Bedtime      simvastatin (ZOCOR) 10 MG tablet Take 10 mg by mouth At Bedtime         STOP taking these medications       fluticasone-salmeterol (ADVAIR) 500-50 MCG/DOSE inhaler Comments:   Reason for Stopping:         ranitidine HCl 300 MG CAPS Comments:   Reason for Stopping:             Allergies   No Known Allergies    Time Spent on this Encounter   I, Karly Morillo, personally saw the patient today and spent less than or equal to 30 minutes discharging this patient.      Karly GUERRA, CNS  Division of Vascular Surgery  [facility]    Pager [phone]  Office [phone]

## 2019-05-02 NOTE — PLAN OF CARE
Vital signs:  Temp: 98  F (36.7  C) Temp src: Oral BP: 125/68 Pulse: 78 Resp: 20 SpO2: 94 % O2 Device: None (Room air)     A&Ox4. Pt denies pain & nausea. Sats 92-94% on RA, dyspnea on exertion. /75, Hydralazine prn x1, Recheck 125/68. R groin site c/d/i, BHANU. BLE doppler pulses, warm to touch, denies numbness or tingling. Voiding in urinal. +BS, +flatus, no BM. New L PIV is SL. Pt was restless overnight, pt stated he want to go home, called wife to come & pick him up. Bed alarm & chair alarm on for safety. No acute changes overnight. Continue POC.

## 2019-05-02 NOTE — PLAN OF CARE
OT 7B: Discharge Planner OT   Patient plan for discharge: Pt feeling strongly about leaving for home as soon as possible; Pt is reluctantly agreeable to home OT/PT - daughter and wife encouraging of home care services  Current status: Pt is conversational though impulsive and impatient as he perseverates on discharging as soon as possible.  Somewhat able to redirect.  Pt is presenting with 4 to 4+/5 strength in RUE compared to 5/5 strength in LUE.  RLE weakness present and notable within functional tasks and ambulation.  Wife acknowledges RLE movement/gait is different than baseline.  Provided education on home safety considerations, fall prevention recommendations, and impact of acute deficits on ADL completion.  Pt wife confirms she will be with the pt 24hr/day.   Educated pt and family on signs and symptoms of stroke given pt current presentation.  Please refer to PT note indicating that MD team has been notified of pt asymmetrical presentation.     Barriers to return to prior living situation: high fall risk, R sided weakness, impaired balance, assist required for self care completion  Recommendations for discharge: TCU  Rationale for recommendations: Pt is currently below baseline with regards to mobility and independence with self cares and will benefit from continued skilled therapy intervention to address deficits. However, pt is refusing therefore recommend 24hr assist, CGA and walker for all mobility, and home OT/PT to progress safety and independence with self cares.         Entered by: Tamra Flores 05/02/2019 2:04 PM

## 2019-05-02 NOTE — PLAN OF CARE
Occupational Therapy Discharge Summary    Reason for therapy discharge:    Discharged to home with home therapy.    Progress towards therapy goal(s). See goals on Care Plan in [facility] electronic health record for goal details.  Goals partially met.  Barriers to achieving goals:   discharge from facility.    Therapy recommendation(s):    Continued therapy is recommended.  Rationale/Recommendations:  Recommendation was for TCU; however, pt is refusing.  Pt is a high fall risk at this time and presents with R sided deficits, MD team was notified. Recommend 24 hr assist, use of walker at all times, and home OT/PT to address safety and independence with mobility and self cares

## 2019-10-01 ENCOUNTER — RECORDS - HEALTHEAST (OUTPATIENT)
Dept: ADMINISTRATIVE | Facility: OTHER | Age: 79
End: 2019-10-01

## 2019-10-24 ENCOUNTER — RECORDS - HEALTHEAST (OUTPATIENT)
Dept: ADMINISTRATIVE | Facility: OTHER | Age: 79
End: 2019-10-24

## 2019-10-24 LAB
LAB AP AUTOPSY NOTE (HE HISTORICAL CONVERSION): NORMAL
PATH FINDINGS: NORMAL
PATH REPORT.COMMENTS IMP SPEC: NORMAL
PATH REPORT.FINAL DX SPEC: NORMAL
PATH REPORT.GROSS SPEC: NORMAL
PATH REPORT.MICROSCOPIC SPEC OTHER STN: NORMAL
PATH REPORT.PRELIMIN DX SPEC-IMP: NORMAL
PATH REPORT.RELEVANT HX SPEC: NORMAL
PATH REPORT.SITE OF ORIGIN SPEC: NORMAL
PATH REPORT.SITE OF ORIGIN SPEC: NORMAL

## 2021-05-31 VITALS — WEIGHT: 207.7 LBS | BODY MASS INDEX: 29.73 KG/M2 | HEIGHT: 70 IN

## 2021-05-31 VITALS — WEIGHT: 205 LBS | BODY MASS INDEX: 29.35 KG/M2 | HEIGHT: 70 IN

## 2021-06-14 NOTE — PROGRESS NOTES
Instructed on proper use of Breo 200 mg inhaler.  Demonstrated back correctly, first dose taken and instructed on importance of rinsing mouth after use.  Number given to call with questions or concerns.

## 2021-06-14 NOTE — PROGRESS NOTES
RESPIRATORY CARE NOTE     Patient Name: Kai Carreon  Today's Date: 12/12/2017     Complete PFT done. ABG done on RA before test was also done. PH 7.42, PCO2 37, PO2 69, HCO3 24.7, SaO2 96.2%. Patient had taken combivent inhaler at 8am and was given Albuterol 2.5 mg as bronchodilator at 1121 a.m. For PFT. Pt performed tests with good effort. Test results meet ATS criteria. Results scanned into epic. Pt left in no distress.       Ksenia Head LRT    RESPIRATORY CARE NOTE     Patient Name: Kai Carreon  Today's Date: 12/12/2017     Problem List  Patient Active Problem List   Diagnosis     Multiple rib fractures     Rib fracture     Abdominal aortic aneurysm (AAA) without rupture     Fall, initial encounter     Elevated BP                           Ksenia Head LRT

## 2021-06-15 NOTE — PROGRESS NOTES
We received a PA for pt's Breo Ellipta 200-25 mcg. The formulary alternative is Advair.   ([facility]) said it was ok to switch to the Advair. Carla is going to send new rx for this and once that is done, I will call pt to let him know.

## 2021-06-15 NOTE — PROGRESS NOTES
PULMONARY OUTPATIENT CONSULT NOTE    Assessment:   1. COPD  PFTs showed moderately severe obstructive lung disease, no significant post bronchodilator response, mild decrease diffusion capacity.   Add LABA/ICS, combivent as needed.   2. 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 Worked in [employer] as a   3. GERD  Raise the head of the bed. Avoid eating close to bedtime , at least three hours. Ranitidine 300 mg QHS  4. Tobacco user  Smoking cessation counseling   5. Pulmonary preoperative evaluation   Plan for abdominal surgical repair of AAA extending into the common iliac arteries.   Base on type of surgery, age and commodities, pt has moderate risk of post operative respiratory failure. COPD symptoms are stable, recommend early deep breathing exercises, epidural analgesia for pain control. Contact pulmonary if further assistance is needed with extubation.       Plan:   1. BREO one puff BID, rinse your mouth with water after each use  2. Combivent 2 puffs every 4 hours as needed  3. Raise the head of the bed  4. Avoid eating close to bedtime , at least three hours  5. Ranitidine 300 mg QHS  6. Smoking cessation counseling  7. Follow up in 6 months    Thank you for this consultation.     Aldo Ji  Pulmonary / Critical Care  12/15/2017      CC:      Chief Complaint   Patient presents with     Consult     pt fell in August 2017 and broke some ribs, pt states that he had a scan and they found an anuresm, pt is here for surgical clearance to get it taken care of       HPI:       Kai Carreon is an 77 y.o. male who presents for   Patient has history of COPD, HTN, PVD.   Reports exertional dyspnea after walking 1/2 block, mild dry cough, denies wheezes. Uses combivent three times a day.   Denies orthopnea, PND or swelling of LEs.   Acid reflux with certain food.   Denies sleeping problems.   Used to work as 3 [employer] , exposed to asbestos.   Tobacco user 1/2 to 1 ppd for  60 years, continuous to smoke.   Abdomen CT scan done on 8/2017 for evaluation of RUQ pain, showed diffusely aneurysmal abdominal aorta (5.8 cm max) extending into the common iliac arteries, patient was evaluated by surgery, plan for surgical repair.     Past Medical History:   Diagnosis Date     Abdominal aneurysm      Hyperlipidemia      Hypertension      Smoker      Medications:     Prior to Admission medications    Medication Sig Start Date End Date Taking? Authorizing Provider   acetaminophen (TYLENOL) 325 MG tablet Take 2 tablets (650 mg total) by mouth every 4 (four) hours as needed. 8/3/17  Yes Mejia Cadet MD   acetaminophen (TYLENOL) 325 MG tablet Take 2 tablets (650 mg total) by mouth 4 (four) times a day. 8/3/17  Yes Mejia Cadet MD   atenolol (TENORMIN) 25 MG tablet daily 8/30/17  Yes PROVIDER, HISTORICAL   COMBIVENT RESPIMAT  mcg/actuation Mist inhaler  8/23/17  Yes PROVIDER, HISTORICAL   simvastatin (ZOCOR) 10 MG tablet qhs 8/30/17  Yes PROVIDER, HISTORICAL   fluticasone-vilanterol (BREO ELLIPTA) 200-25 mcg/dose DsDv inhaler Inhale 1 puff daily. 12/15/17   Aldo Ji MD   ranitidine (ZANTAC) 300 MG capsule Take 1 capsule (300 mg total) by mouth every evening. 12/15/17   Aldo Ji MD     Social History     Social History     Marital status:      Spouse name: N/A     Number of children: N/A     Years of education: N/A     Occupational History     Not on file.     Social History Main Topics     Smoking status: Current Every Day Smoker     Packs/day: 0.50     Years: 60.00     Types: Cigarettes     Smokeless tobacco: Never Used      Comment: Nosmoke exposure     Alcohol use Not on file     Drug use: Not on file     Sexual activity: Not on file     Other Topics Concern     Not on file     Social History Narrative    UTD immunizations.      Family History   Problem Relation Age of Onset     Hypertension Father      Review of Systems  A 12 point  "comprehensive review of systems was negative except as noted.     Objective:       Vitals:    12/15/17 1253   BP: 122/82   Pulse: (!) 58   Resp: 16   SpO2: 97%   Weight: 207 lb 11.2 oz (94.2 kg)   Height: 5' 9.5\" (1.765 m)     Gen: awake, alert, no distress  HEENT: pink conjunctiva, moist mucosa, Mallampati II/IV  Neck: no thyromegaly, masses or JVD  Lungs: clear  CV: regular, no murmurs or gallops appreciated  Abdomen: soft, NT, BS wnl  Ext: no edema  Neuro: CN II-XII intact, non focal      Diagnostic tests:     PFTs (12/12/2017)  FEV1/FVC is 61 and is reduced.  FEV1 is 1.53 L (53%) predicted and is reduced.  FVC is 2.50 L (64%) predicted and reduced.  There was no improvement in spirometry after a single inhaled dose of bronchodilator.  TLC is 5.64 L (79%) predicted and is reduced.  RV is 2.79 L (97%) predicted and is normal.  DLCO is 76% predicted and is reduced when it is not corrected for hemoglobin.     ABG 7.42/37/69/24.7/96.2% on room air     CT ABDOMEN PELVIS WO ORAL W IV CONTRAST  8/2/2017 9:26 PM  INDICATION: RUQ pain after fall, 4th - 7th rib fractures on right seen on chest xray  COMPARISON: None.  LUNG BASES: Bilateral calcified pleural plaques. Mild basilar scarring or fibrosis. Mild lower lung airway thickening and bronchiolectasis. Small hiatus hernia. Ectatic aorta.  ABDOMEN: Unremarkable liver, gallbladder, pancreas, spleen, adrenals and kidneys. Diffusely aneurysmal abdominal aorta which is most pronounced in the infrarenal region measuring up to 5.7 x 5.8 cm with marked mural noncalcified thrombus (series 2, image 68). Aneurysm extends into the bilateral common iliac arteries measuring up to 2.4 cm on the right. No free fluid or air. No adenopathy. Small fat-containing umbilical hernia.  PELVIS: Mild prostatomegaly. Mild urinary bladder wall thickening. No free fluid or adenopathy. Sigmoid predominant colonic diverticulosis with faint stranding near the proximal sigmoid colon (series 2, image " 101). Normal appendix.  MUSCULOSKELETAL: Severe disc space narrowing at L5-S1. No definite fracture.  CONCLUSION:  1.  No evidence for acute abdominal or pelvic traumatic abnormality.  2.  Diffusely aneurysmal abdominal aorta (5.8 cm max) extending into the common iliac arteries.  3.  Diverticulosis with faint proximal sigmoid pericolonic stranding suggesting minimal diverticulitis. No collection or free air.  4.  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   5.  Other findings as detailed.

## 2021-06-15 NOTE — PROGRESS NOTES
I called and informed pt that we needed to switch his Breo to Advair and that we already sent the Rx to the pharmacy for them.  I asked pt. to let us know if they have any side effects or if it is not working.  he verbalizes understanding and agrees to this plan.  He mentioned that whenever he uses the Combivent Respimat, he throws up.  This has happened a few times now and they want to know if there is anything else that he can use.  I talked to Dr. Matson ([facility]) and he said that we can send over prescription for albuterol instead.

## 2021-06-25 NOTE — PROGRESS NOTES
Progress Notes by Enrike Good DO at 9/13/2017  3:10 PM     Author: Enrike Good DO Service: -- Author Type: Physician    Filed: 9/14/2017 11:18 AM Encounter Date: 9/13/2017 Status: Signed    : Enrike Good DO (Physician)           Click to link to [facility]     [facility] HEART CARE NOTE    Thank you, Dr. Moore, for asking the [facility] team to see Mr. Kai Carreon to evaluate irregular heart beating pattern.      Assessment/Recommendations   Assessment:    1. Irregular heart rate related to sinus arrhythmia and occasional premature atrial contractions.   2. Severely enlarged abdominal aortic aneurysm (5.8 cm)   3. Active smoker  4. Chronic exertional dyspnea. Likely has underlying COPD    Plan:  1. No further cardiac testing required for sinus arrhyhtmia and  Premature atrial contractions.  2. Vascular surgery follow-up in required for severely enlarge AAA.  Dr. Campos remote reviewed case.   3. Prior to any vascular surgery would recommend Lexiscan NM stress testing is preformed, if normal could then proceed with vascular surgery.  Patient want to defer stress testing until vascular work-up is completed.    4. Consider screen abdominal ultrasound in children to assess for abdominal aneurysm.  5. Stop smoking.  6.  Continue atenolol and simvastatin therapy       History of Present Illness    Mr. Kai Carreon is a 77 y.o. male with recent diagnosis of large abdominal aortic aneurysm who presents in cardiology clinic in consultation for irregular heart beat.     Review of recent EKG it appears the patient has sinus arrhythmia with occasional premature atrial contractions.  He does not have any history of A. fib.  There is no EKG demonstrating atrial fibrillation in our records.  Patient does not experience any palpitations, syncope or near syncope.      He does have chronic mild dyspnea on exertion which he relates to his 60 years of  smoking.  He currently is an active smoker.  Denies any anginal chest pain, orthopnea, PND or lower examinee edema.  He has no history of heart valvular issues.    No recent stress testing.  Patient has no prior history of coronary artery disease or coronary angiogram.  He has normal renal function.  No history of diabetes mellitus.  Patient states he remains active at home including doing yard work.     Physical Examination Review of Systems   Vitals:    09/13/17 1457   BP: 124/80   Pulse: 60   Resp: 20     Body mass index is 29.41 kg/(m^2).  Wt Readings from Last 3 Encounters:   09/13/17 205 lb (93 kg)   08/02/17 204 lb (92.5 kg)       General Appearance:   no distress, normal body habitus   ENT/Mouth: membranes moist, no oral lesions or bleeding gums.      EYES:  no scleral icterus, normal conjunctivae   Neck: no carotid bruits or thyromegaly   Chest/Lungs:   lungs are clear to auscultation, no rales or wheezing, no sternal scar, equal chest wall expansion.    Cardiovascular:   Distant.  Mildly irregular. Normal first and second heart sounds with no murmurs, rubs, or gallops; the carotid, radial and posterior tibial pulses are intact but decreased, Jugular venous pressure normal, no edema bilaterally    Abdomen:  no organomegaly, masses, or tenderness; bowel sounds are present   Extremities: no cyanosis or clubbing   Skin: no xanthelasma, warm.    Neurologic: normal gait, normal  bilateral, no tremors     Psychiatric: alert and oriented x3, calm     General: WNL  Eyes: WNL  Ears/Nose/Throat: WNL  Lungs: WNL  Heart: Shortness of Breath with activity  Stomach: WNL  Bladder: WNL  Muscle/Joints: WNL  Skin: WNL  Nervous System: WNL  Mental Health: WNL     Blood: WNL     Medical History  Surgical History Family History Social History   Past Medical History:   Diagnosis Date   ? Abdominal aneurysm    ? Hyperlipidemia    ? Hypertension    ? Smoker     No prior cardiac surgery or coronary stenting. Family History    Problem Relation Age of Onset   ? Hypertension Father     Social History     Social History   ? Marital status:      Spouse name: N/A   ? Number of children: N/A   ? Years of education: N/A     Occupational History   ? Not on file.     Social History Main Topics   ? Smoking status: Current Every Day Smoker     Packs/day: 1.50     Types: Cigarettes   ? Smokeless tobacco: Not on file      Comment: [place]moke exposure   ? Alcohol use Not on file   ? Drug use: Not on file   ? Sexual activity: Not on file     Other Topics Concern   ? Not on file     Social History Narrative    UTD immunizations.           Medications  Allergies   Current Outpatient Prescriptions   Medication Sig Dispense Refill   ? acetaminophen (TYLENOL) 325 MG tablet Take 2 tablets (650 mg total) by mouth every 4 (four) hours as needed.  0   ? acetaminophen (TYLENOL) 325 MG tablet Take 2 tablets (650 mg total) by mouth 4 (four) times a day.  0   ? atenolol (TENORMIN) 25 MG tablet daily     ? simvastatin (ZOCOR) 10 MG tablet qhs     ? COMBIVENT RESPIMAT  mcg/actuation Mist inhaler        No current facility-administered medications for this visit.       No Known Allergies      Lab Results    Chemistry/lipid CBC Cardiac Enzymes/BNP/TSH/INR   Lab Results   Component Value Date    CHOL 135 08/23/2017    HDL 41 08/23/2017    LDLCALC 81 08/23/2017    TRIG 64 08/23/2017    CREATININE 1.17 08/23/2017    BUN 24 08/23/2017    K 5.0 08/23/2017     08/23/2017     08/23/2017    CO2 27 08/23/2017    Lab Results   Component Value Date    WBC 8.3 08/03/2017    HGB 13.2 (L) 08/03/2017    HCT 39.0 (L) 08/03/2017    MCV 90 08/03/2017     08/03/2017    Lab Results   Component Value Date    TROPONINI <0.01 08/03/2017    TSH 3.15 08/23/2017    INR 1.02 08/02/2017

## (undated) DEVICE — SYR MEDRAD 150ML MARK 7 ARTERION ART700SYR

## (undated) DEVICE — DRAPE SHEET REV FOLD 3/4 9349

## (undated) DEVICE — CONNECTOR STOPCOCK 3-WAY HIGH PRESSURE (NAMIC) H965700550091

## (undated) DEVICE — DRSG TELFA 3X8" 1238

## (undated) DEVICE — LABEL MEDICATION SYSTEM 3303-P

## (undated) DEVICE — CELL SAVER

## (undated) DEVICE — SHEATH INTRODUCER DRYSEAL FLEX W/HYDRO CT 12FRX33CM DSF1233

## (undated) DEVICE — CLIP HORIZON MED BLUE 002200

## (undated) DEVICE — SU SILK 3-0 RB-1 CR 8X18" C053D

## (undated) DEVICE — DRAPE C-ARM W/STRAPS 42X72" 07-CA104

## (undated) DEVICE — GEL ULTRASOUND AQUASONIC 20GM 01-01

## (undated) DEVICE — PREP CHLORAPREP 26ML TINTED ORANGE  260815

## (undated) DEVICE — Device

## (undated) DEVICE — GLOVE PROTEXIS BLUE W/NEU-THERA 7.5  2D73EB75

## (undated) DEVICE — LINEN TOWEL PACK X6 WHITE 5487

## (undated) DEVICE — VESSEL LOOPS YELLOW MAXI 31145694

## (undated) DEVICE — WIRE GUIDE LUNDERQUIST 0.035"X260CM DBL CVD

## (undated) DEVICE — LINEN TOWEL PACK X30 5481

## (undated) DEVICE — GUIDEWIRE TERUMO .035X180 ANG GR3508

## (undated) DEVICE — GLOVE PROTEXIS W/NEU-THERA 7.0  2D73TE70

## (undated) DEVICE — DRAPE STERI FLUOROSCOPE 35X43"1012 LATEX FREE

## (undated) DEVICE — CATH BALLOON RELIANT STENT GRAFT 8FR REL46

## (undated) DEVICE — WIPES FOLEY CARE SURESTEP PROVON DFC100

## (undated) DEVICE — CATH OMNIFLUSH 5FRX70CM SIZING 13709702

## (undated) DEVICE — SU SILK 0 TIE 6X30" A306H

## (undated) DEVICE — VESSEL LOOPS BLUE SUPERMAXI 011022PBX

## (undated) DEVICE — CLOSURE DEVICE 6FR VASC PROGLIDE MEDICATED SUTURE 12673-03

## (undated) DEVICE — DRAPE IOBAN INCISE 23X17" 6650EZ

## (undated) DEVICE — SU DERMABOND ADVANCED .7ML DNX12

## (undated) DEVICE — ESU GROUND PAD ADULT REM W/15' CORD E7507DB

## (undated) DEVICE — COVER ULTRASOUND PROBE W/GEL FLEXI-FEEL 6"X58" LF  25-FF658

## (undated) DEVICE — LINEN GOWN XLG 5407

## (undated) DEVICE — TUBING MEDRAD 48" HIGH PRESSURE MX694

## (undated) DEVICE — CLIP HORIZON SM RED WIDE SLOT 001201

## (undated) DEVICE — DRAPE CV SPLIT II 147X106" 9158

## (undated) DEVICE — SOL NACL 0.9% IRRIG 1000ML BOTTLE 2F7124

## (undated) DEVICE — SU UMBILICAL TAPE .125X30" U11T

## (undated) DEVICE — INTRO SHEATH BRITE TIP 7FRX11CM 401-711M

## (undated) DEVICE — INTRO SHEATH BRITE TIP 11FRX11CM 401-111M

## (undated) DEVICE — INSERT FOGARTY 61MM TRACTION HYDRAJAW HYDRA61

## (undated) DEVICE — PACK ADULT HEART UMMC PV15CG92D

## (undated) DEVICE — CLIP HORIZON LG ORANGE 004200

## (undated) DEVICE — BLADE CLIPPER SGL USE 9680

## (undated) RX ORDER — IODIXANOL 320 MG/ML
INJECTION, SOLUTION INTRAVASCULAR
Status: DISPENSED
Start: 2019-01-01

## (undated) RX ORDER — BUPIVACAINE HYDROCHLORIDE 2.5 MG/ML
INJECTION, SOLUTION EPIDURAL; INFILTRATION; INTRACAUDAL
Status: DISPENSED
Start: 2019-01-01

## (undated) RX ORDER — HEPARIN SODIUM 1000 [USP'U]/ML
INJECTION, SOLUTION INTRAVENOUS; SUBCUTANEOUS
Status: DISPENSED
Start: 2019-01-01